# Patient Record
Sex: MALE | Race: WHITE | Employment: OTHER | URBAN - METROPOLITAN AREA
[De-identification: names, ages, dates, MRNs, and addresses within clinical notes are randomized per-mention and may not be internally consistent; named-entity substitution may affect disease eponyms.]

---

## 2019-11-13 ENCOUNTER — HOSPITAL ENCOUNTER (EMERGENCY)
Facility: CLINIC | Age: 74
Discharge: HOME | End: 2019-11-14
Attending: EMERGENCY MEDICINE | Admitting: EMERGENCY MEDICINE
Payer: MEDICARE

## 2019-11-13 ENCOUNTER — APPOINTMENT (OUTPATIENT)
Dept: GENERAL RADIOLOGY | Facility: CLINIC | Age: 74
End: 2019-11-13
Attending: EMERGENCY MEDICINE
Payer: MEDICARE

## 2019-11-13 VITALS
SYSTOLIC BLOOD PRESSURE: 136 MMHG | HEART RATE: 74 BPM | OXYGEN SATURATION: 99 % | DIASTOLIC BLOOD PRESSURE: 93 MMHG | RESPIRATION RATE: 18 BRPM | TEMPERATURE: 98.1 F

## 2019-11-13 DIAGNOSIS — S80.02XA CONTUSION OF LEFT KNEE, INITIAL ENCOUNTER: ICD-10-CM

## 2019-11-13 DIAGNOSIS — S80.01XA CONTUSION OF RIGHT KNEE, INITIAL ENCOUNTER: ICD-10-CM

## 2019-11-13 PROCEDURE — 73562 X-RAY EXAM OF KNEE 3: CPT | Mod: 50

## 2019-11-13 PROCEDURE — 99283 EMERGENCY DEPT VISIT LOW MDM: CPT

## 2019-11-13 ASSESSMENT — ENCOUNTER SYMPTOMS
ABDOMINAL PAIN: 0
ARTHRALGIAS: 1
JOINT SWELLING: 1
MYALGIAS: 1
NECK PAIN: 0

## 2019-11-13 NOTE — ED AVS SNAPSHOT
[facility] Emergency Department  [address]  [facility] [address]  Phone:  [phone]  Fax:  [phone]                                    Ajay Bowden   MRN: [id number]    Department:  [facility] Emergency Department   Date of Visit:  11/13/2019           After Visit Summary Signature Page    I have received my discharge instructions, and my questions have been answered. I have discussed any challenges I see with this plan with the nurse or doctor.    ..........................................................................................................................................  Patient/Patient Representative Signature      ..........................................................................................................................................  Patient Representative Print Name and Relationship to Patient    ..................................................               ................................................  Date                                   Time    ..........................................................................................................................................  Reviewed by Signature/Title    ...................................................              ..............................................  Date                                               Time          22EPIC Rev 08/18

## 2019-11-14 RX ORDER — SENNOSIDES 8.6 MG
1 TABLET ORAL 2 TIMES DAILY PRN
Qty: 30 TABLET | Refills: 0 | Status: SHIPPED | OUTPATIENT
Start: 2019-11-14

## 2019-11-14 RX ORDER — HYDROCODONE BITARTRATE AND ACETAMINOPHEN 5; 325 MG/1; MG/1
1 TABLET ORAL EVERY 4 HOURS PRN
Qty: 12 TABLET | Refills: 0 | Status: SHIPPED | OUTPATIENT
Start: 2019-11-14 | End: 2019-11-17

## 2019-11-14 NOTE — ED TRIAGE NOTES
Slip on ice and fell down onto both knees with both arms extended out to brace fall. Now c/o bilateral knee pain. Able to ambulate but with pain about 1 block back home. CMST intact. Denies any head injury, no LOC, no cervical tenderness. ABCs intact.

## 2019-11-14 NOTE — DISCHARGE INSTRUCTIONS
Please make an appointment to follow up with your primary care provider in 7 days if not improving.

## 2019-11-14 NOTE — ED PROVIDER NOTES
History     Chief Complaint:  Fall    HPI   Ajay Bowden is a 74 year old male, with a history of hypertension and hyperlipidemia, who presents with his wife to the ED for evaluation of mechanical fall. The patient reports he was on a sloped sidewalk and slipped on ice today. He fell forward onto his bilateral knees at 5:00PM. The left knee is worse. He was able to ambulate a block back home with pain. He had difficulty ambulating into the vehicle this evening and certain movement worsens the pain. He has taken Ibuprofen and been icing. The pain is non-radiating and aggravated by ambulation. The patient denies any loss of consciousness, head trauma, neck pain, chest pain, abdominal pain, hip pain, or other injuries. The patient denies history of knee surgeries or blood thinners.     Allergies:  Hydrochlorothiazide: urinary frequency & urgency      Medications:    Multivitamin  Vitamin D  Revatio   Nifedipine   Atorvastatin   Labetalol   Losartan   Flomax     Past Medical History:    Erectile dysfunction   GOMEZ, on CPAP  BPH  Colon polyps   Kidney stone   HLD  HTN    Past Surgical History:    Bilateral rotator cuff repair  Right inguinal herniorrhaphy   Lithotripsy   Umbilical herniorrhaphy   T&A  Sherif     Family History:    Prostate cancer: father  HTN, Alzheimer's: mother     Social History:  Smoking status: Never smoker    Alcohol use: Yes  Presents to ED with wife       Review of Systems   Cardiovascular: Negative for chest pain.   Gastrointestinal: Negative for abdominal pain.   Musculoskeletal: Positive for arthralgias, joint swelling and myalgias. Negative for neck pain.   Neurological: Negative for syncope.   All other systems reviewed and are negative.    Physical Exam     Patient Vitals for the past 24 hrs:   BP Temp Temp src Pulse Heart Rate Resp SpO2   11/13/19 2301 (!) 136/93 98.1  F (36.7  C) Temporal 74 74 18 99 %     Physical Exam    General:   Pleasant, age appropriate.  EYES:   Conjunctiva  normal.  NECK:    Supple, no meningismus.   CV:     Regular rate and rhythm     No murmurs, rubs or gallops.       2+ DP pulses bilateral.  PULM:    Clear to auscultation bilateral.       No respiratory distress.      No wheezing, rales or stridor.  ABD:    Soft, non-tender, non-distended.  No pulsatile masses.       No rebound or guarding.  MSK:     Right lower extremity :       No gross deformity.       No tenderness at the hip or ankle.      Knee:       Moderate soft tissue swelling       Full passive ROM.       Mild diffuse tenderness       Extensor mechanism intact.         Anterior and posterior drawer test normal.       Lachman's test normal.       No laxity of the MCL or LCL with valgus or varus strain.     Leftt lower extremity :       No gross deformity.       No tenderness at the hip or ankle.      Knee:       Moderate soft tissue swelling       Full passive ROM.       Moderate focal tenderness at medial joint space       Extensor mechanism intact.         Anterior and posterior drawer test normal.       Lachman's test normal.       No laxity of the MCL or LCL with valgus or varus strain.  LYMPH:   No cervical lymphadenopathy.  NEURO:   Lower extremities :Strength is 5/5      Sensation intact.  SKIN:    Warm, dry and intact.    PSYCH:    Mood is good and affect is appropriate.    Emergency Department Course     Imaging:  Radiographic findings were communicated with the patient and family who voiced understanding of the findings.    XR Knee Bilateral 3 Views  IMPRESSION:   RIGHT KNEE: Mild degenerative change. Anterior soft tissue edema and small joint effusion. Vascular calcification. No visible fracture or dislocation.  LEFT KNEE: Mild degenerative change. Anterior soft tissue edema and small joint effusion. No visible fracture or dislocation. Vascular calcification.  As read by Radiology.     Emergency Department Course:  Past medical records, nursing notes, and vitals reviewed.    2305: I performed an  exam of the patient as documented above.     The patient was sent for a bilateral knee x-ray while in the emergency department, results above.     2354: I rechecked the patient and discussed the results of his workup thus far.     0008: Per nurse's report, the patient was able to ambulate.     Findings and plan explained to the patient and spouse. Patient discharged home with instructions regarding supportive care, medications, and reasons to return. The importance of close follow-up was reviewed. The patient was prescribed Norco and Senokot.     I personally reviewed the results with the patient and spouse and answered all related questions prior to discharge.     Impression & Plan      Medical Decision Makin-year-old male presented to the ED with mechanical fall and subsequent traumatic bilateral knee pain.  Extensor mechanism and ligaments intact.  Plain films are unremarkable with no signs of fracture or dislocation.  Patient was able to ambulate in the ED.  No features concerning for occult fracture.  Findings are consistent with soft tissue contusion.  Patient safe for discharge home with supportive measures.    Diagnosis:    ICD-10-CM   1. Contusion of right knee, initial encounter S80.01XA   2. Contusion of left knee, initial encounter S80.02XA     Disposition: Patient discharged to home with wife      Discharge Medications:  New Prescriptions    HYDROCODONE-ACETAMINOPHEN (NORCO) 5-325 MG TABLET    Take 1 tablet by mouth every 4 hours as needed for severe pain    SENNOSIDES (SENOKOT) 8.6 MG TABLET    Take 1 tablet by mouth 2 times daily as needed for constipation     July Rowan  2019   [facility] EMERGENCY DEPARTMENT    Scribe Disclosure:  I, July Rowan, am serving as a scribe at 11:05 PM on 2019 to document services personally performed by Nestor Donald MD based on my observations and the provider's statements to me.        Nestor Donald  MD  11/14/19 0157

## 2022-04-08 ENCOUNTER — HOSPITAL ENCOUNTER (OUTPATIENT)
Facility: CLINIC | Age: 77
End: 2022-04-08
Attending: UROLOGY | Admitting: UROLOGY
Payer: MEDICARE

## 2025-05-21 ENCOUNTER — APPOINTMENT (OUTPATIENT)
Dept: MRI IMAGING | Facility: CLINIC | Age: 80
DRG: 872 | End: 2025-05-21
Attending: STUDENT IN AN ORGANIZED HEALTH CARE EDUCATION/TRAINING PROGRAM
Payer: COMMERCIAL

## 2025-05-21 ENCOUNTER — APPOINTMENT (OUTPATIENT)
Dept: GENERAL RADIOLOGY | Facility: CLINIC | Age: 80
DRG: 872 | End: 2025-05-21
Attending: STUDENT IN AN ORGANIZED HEALTH CARE EDUCATION/TRAINING PROGRAM
Payer: COMMERCIAL

## 2025-05-21 ENCOUNTER — HOSPITAL ENCOUNTER (INPATIENT)
Facility: CLINIC | Age: 80
DRG: 872 | End: 2025-05-21
Attending: STUDENT IN AN ORGANIZED HEALTH CARE EDUCATION/TRAINING PROGRAM | Admitting: HOSPITALIST
Payer: COMMERCIAL

## 2025-05-21 DIAGNOSIS — R50.9 FEVER IN ADULT: ICD-10-CM

## 2025-05-21 DIAGNOSIS — S22.31XA CLOSED FRACTURE OF ONE RIB OF RIGHT SIDE, INITIAL ENCOUNTER: ICD-10-CM

## 2025-05-21 DIAGNOSIS — R53.1 GENERALIZED WEAKNESS: ICD-10-CM

## 2025-05-21 DIAGNOSIS — N30.00 ACUTE CYSTITIS WITHOUT HEMATURIA: Primary | ICD-10-CM

## 2025-05-21 PROBLEM — G47.33 OSA (OBSTRUCTIVE SLEEP APNEA): Status: ACTIVE | Noted: 2017-10-05

## 2025-05-21 PROBLEM — N40.0 BPH WITHOUT URINARY OBSTRUCTION: Status: ACTIVE | Noted: 2017-08-09

## 2025-05-21 PROBLEM — F32.A ANXIETY AND DEPRESSION: Status: ACTIVE | Noted: 2024-02-08

## 2025-05-21 PROBLEM — F41.9 ANXIETY AND DEPRESSION: Status: ACTIVE | Noted: 2024-02-08

## 2025-05-21 PROBLEM — H81.11 BENIGN PAROXYSMAL POSITIONAL VERTIGO OF RIGHT EAR: Status: ACTIVE | Noted: 2022-09-16

## 2025-05-21 PROBLEM — K40.90 LEFT INGUINAL HERNIA: Status: ACTIVE | Noted: 2024-10-01

## 2025-05-21 PROBLEM — K40.91 UNILATERAL RECURRENT INGUINAL HERNIA WITHOUT OBSTRUCTION OR GANGRENE: Status: ACTIVE | Noted: 2025-04-08

## 2025-05-21 LAB
ALBUMIN UR-MCNC: 200 MG/DL
ANION GAP SERPL CALCULATED.3IONS-SCNC: 15 MMOL/L (ref 7–15)
APPEARANCE UR: ABNORMAL
BILIRUB UR QL STRIP: NEGATIVE
BUN SERPL-MCNC: 17.6 MG/DL (ref 8–23)
CALCIUM SERPL-MCNC: 9.8 MG/DL (ref 8.8–10.4)
CHLORIDE SERPL-SCNC: 102 MMOL/L (ref 98–107)
CK SERPL-CCNC: 106 U/L (ref 39–308)
COLOR UR AUTO: YELLOW
CREAT SERPL-MCNC: 1.09 MG/DL (ref 0.67–1.17)
EGFRCR SERPLBLD CKD-EPI 2021: 70 ML/MIN/1.73M2
ERYTHROCYTE [DISTWIDTH] IN BLOOD BY AUTOMATED COUNT: 14.2 % (ref 10–15)
FLUAV RNA SPEC QL NAA+PROBE: NEGATIVE
FLUBV RNA RESP QL NAA+PROBE: NEGATIVE
GLUCOSE SERPL-MCNC: 126 MG/DL (ref 70–99)
GLUCOSE UR STRIP-MCNC: NEGATIVE MG/DL
HCO3 SERPL-SCNC: 20 MMOL/L (ref 22–29)
HCT VFR BLD AUTO: 43.4 % (ref 40–53)
HGB BLD-MCNC: 15.1 G/DL (ref 13.3–17.7)
HGB UR QL STRIP: ABNORMAL
HOLD SPECIMEN: NORMAL
KETONES UR STRIP-MCNC: 20 MG/DL
LACTATE SERPL-SCNC: 0.8 MMOL/L (ref 0.7–2)
LEUKOCYTE ESTERASE UR QL STRIP: ABNORMAL
MCH RBC QN AUTO: 30 PG (ref 26.5–33)
MCHC RBC AUTO-ENTMCNC: 34.8 G/DL (ref 31.5–36.5)
MCV RBC AUTO: 86 FL (ref 78–100)
MUCOUS THREADS #/AREA URNS LPF: PRESENT /LPF
NITRATE UR QL: NEGATIVE
PH UR STRIP: 6 [PH] (ref 5–7)
PLATELET # BLD AUTO: 261 10E3/UL (ref 150–450)
POTASSIUM SERPL-SCNC: 3.8 MMOL/L (ref 3.4–5.3)
RBC # BLD AUTO: 5.04 10E6/UL (ref 4.4–5.9)
RBC URINE: 45 /HPF
RSV RNA SPEC NAA+PROBE: NEGATIVE
SARS-COV-2 RNA RESP QL NAA+PROBE: NEGATIVE
SODIUM SERPL-SCNC: 137 MMOL/L (ref 135–145)
SP GR UR STRIP: 1.02 (ref 1–1.03)
UROBILINOGEN UR STRIP-MCNC: NORMAL MG/DL
WBC # BLD AUTO: 24.2 10E3/UL (ref 4–11)
WBC URINE: >182 /HPF

## 2025-05-21 PROCEDURE — 250N000011 HC RX IP 250 OP 636: Performed by: STUDENT IN AN ORGANIZED HEALTH CARE EDUCATION/TRAINING PROGRAM

## 2025-05-21 PROCEDURE — 72148 MRI LUMBAR SPINE W/O DYE: CPT

## 2025-05-21 PROCEDURE — 258N000003 HC RX IP 258 OP 636: Performed by: PHYSICIAN ASSISTANT

## 2025-05-21 PROCEDURE — 96366 THER/PROPH/DIAG IV INF ADDON: CPT

## 2025-05-21 PROCEDURE — G0378 HOSPITAL OBSERVATION PER HR: HCPCS

## 2025-05-21 PROCEDURE — 87186 SC STD MICRODIL/AGAR DIL: CPT | Performed by: STUDENT IN AN ORGANIZED HEALTH CARE EDUCATION/TRAINING PROGRAM

## 2025-05-21 PROCEDURE — 96374 THER/PROPH/DIAG INJ IV PUSH: CPT

## 2025-05-21 PROCEDURE — 87637 SARSCOV2&INF A&B&RSV AMP PRB: CPT | Performed by: STUDENT IN AN ORGANIZED HEALTH CARE EDUCATION/TRAINING PROGRAM

## 2025-05-21 PROCEDURE — 36415 COLL VENOUS BLD VENIPUNCTURE: CPT | Performed by: STUDENT IN AN ORGANIZED HEALTH CARE EDUCATION/TRAINING PROGRAM

## 2025-05-21 PROCEDURE — 83605 ASSAY OF LACTIC ACID: CPT | Performed by: STUDENT IN AN ORGANIZED HEALTH CARE EDUCATION/TRAINING PROGRAM

## 2025-05-21 PROCEDURE — 80048 BASIC METABOLIC PNL TOTAL CA: CPT | Performed by: STUDENT IN AN ORGANIZED HEALTH CARE EDUCATION/TRAINING PROGRAM

## 2025-05-21 PROCEDURE — 93005 ELECTROCARDIOGRAM TRACING: CPT

## 2025-05-21 PROCEDURE — 81003 URINALYSIS AUTO W/O SCOPE: CPT | Performed by: STUDENT IN AN ORGANIZED HEALTH CARE EDUCATION/TRAINING PROGRAM

## 2025-05-21 PROCEDURE — 71101 X-RAY EXAM UNILAT RIBS/CHEST: CPT | Mod: RT

## 2025-05-21 PROCEDURE — 85027 COMPLETE CBC AUTOMATED: CPT | Performed by: STUDENT IN AN ORGANIZED HEALTH CARE EDUCATION/TRAINING PROGRAM

## 2025-05-21 PROCEDURE — 85007 BL SMEAR W/DIFF WBC COUNT: CPT | Performed by: STUDENT IN AN ORGANIZED HEALTH CARE EDUCATION/TRAINING PROGRAM

## 2025-05-21 PROCEDURE — 96365 THER/PROPH/DIAG IV INF INIT: CPT

## 2025-05-21 PROCEDURE — 82550 ASSAY OF CK (CPK): CPT | Performed by: STUDENT IN AN ORGANIZED HEALTH CARE EDUCATION/TRAINING PROGRAM

## 2025-05-21 PROCEDURE — 99285 EMERGENCY DEPT VISIT HI MDM: CPT | Mod: 25

## 2025-05-21 PROCEDURE — 99222 1ST HOSP IP/OBS MODERATE 55: CPT | Performed by: PHYSICIAN ASSISTANT

## 2025-05-21 PROCEDURE — 96361 HYDRATE IV INFUSION ADD-ON: CPT

## 2025-05-21 PROCEDURE — 87040 BLOOD CULTURE FOR BACTERIA: CPT | Performed by: PHYSICIAN ASSISTANT

## 2025-05-21 RX ORDER — AMOXICILLIN 250 MG
2 CAPSULE ORAL 2 TIMES DAILY PRN
Status: DISCONTINUED | OUTPATIENT
Start: 2025-05-21 | End: 2025-05-24 | Stop reason: HOSPADM

## 2025-05-21 RX ORDER — POLYETHYLENE GLYCOL 3350 17 G/17G
17 POWDER, FOR SOLUTION ORAL 2 TIMES DAILY PRN
Status: DISCONTINUED | OUTPATIENT
Start: 2025-05-21 | End: 2025-05-24 | Stop reason: HOSPADM

## 2025-05-21 RX ORDER — AMOXICILLIN 250 MG
1 CAPSULE ORAL 2 TIMES DAILY PRN
Status: DISCONTINUED | OUTPATIENT
Start: 2025-05-21 | End: 2025-05-24 | Stop reason: HOSPADM

## 2025-05-21 RX ORDER — ACETAMINOPHEN 500 MG
500 TABLET ORAL EVERY 6 HOURS PRN
COMMUNITY

## 2025-05-21 RX ORDER — ONDANSETRON 4 MG/1
4 TABLET, ORALLY DISINTEGRATING ORAL EVERY 6 HOURS PRN
Status: DISCONTINUED | OUTPATIENT
Start: 2025-05-21 | End: 2025-05-24 | Stop reason: HOSPADM

## 2025-05-21 RX ORDER — ONDANSETRON 2 MG/ML
4 INJECTION INTRAMUSCULAR; INTRAVENOUS EVERY 6 HOURS PRN
Status: DISCONTINUED | OUTPATIENT
Start: 2025-05-21 | End: 2025-05-24 | Stop reason: HOSPADM

## 2025-05-21 RX ORDER — NIFEDIPINE 90 MG/1
90 TABLET, EXTENDED RELEASE ORAL DAILY
COMMUNITY

## 2025-05-21 RX ORDER — VENLAFAXINE HYDROCHLORIDE 75 MG/1
75 CAPSULE, EXTENDED RELEASE ORAL DAILY
COMMUNITY

## 2025-05-21 RX ORDER — LABETALOL 200 MG/1
200 TABLET, FILM COATED ORAL 2 TIMES DAILY
COMMUNITY

## 2025-05-21 RX ORDER — OLMESARTAN MEDOXOMIL 40 MG/1
40 TABLET ORAL EVERY EVENING
COMMUNITY

## 2025-05-21 RX ORDER — ACETAMINOPHEN 325 MG/1
650 TABLET ORAL EVERY 4 HOURS PRN
Status: DISCONTINUED | OUTPATIENT
Start: 2025-05-21 | End: 2025-05-24 | Stop reason: HOSPADM

## 2025-05-21 RX ORDER — SPIRONOLACTONE 25 MG
12.5 TABLET ORAL DAILY
Status: DISCONTINUED | OUTPATIENT
Start: 2025-05-22 | End: 2025-05-24 | Stop reason: HOSPADM

## 2025-05-21 RX ORDER — LOSARTAN POTASSIUM 25 MG/1
100 TABLET ORAL EVERY EVENING
Status: DISCONTINUED | OUTPATIENT
Start: 2025-05-21 | End: 2025-05-24 | Stop reason: HOSPADM

## 2025-05-21 RX ORDER — POLYETHYLENE GLYCOL 3350 17 G/17G
1 POWDER, FOR SOLUTION ORAL DAILY PRN
COMMUNITY

## 2025-05-21 RX ORDER — VENLAFAXINE HYDROCHLORIDE 75 MG/1
75 CAPSULE, EXTENDED RELEASE ORAL DAILY
Status: DISCONTINUED | OUTPATIENT
Start: 2025-05-22 | End: 2025-05-24 | Stop reason: HOSPADM

## 2025-05-21 RX ORDER — ATORVASTATIN CALCIUM 20 MG/1
20 TABLET, FILM COATED ORAL EVERY EVENING
COMMUNITY

## 2025-05-21 RX ORDER — CEFTRIAXONE 1 G/1
1 INJECTION, POWDER, FOR SOLUTION INTRAMUSCULAR; INTRAVENOUS EVERY 24 HOURS
Status: DISCONTINUED | OUTPATIENT
Start: 2025-05-22 | End: 2025-05-21

## 2025-05-21 RX ORDER — NIFEDIPINE 90 MG/1
90 TABLET, EXTENDED RELEASE ORAL DAILY
Status: DISCONTINUED | OUTPATIENT
Start: 2025-05-22 | End: 2025-05-24 | Stop reason: HOSPADM

## 2025-05-21 RX ORDER — PROCHLORPERAZINE MALEATE 5 MG/1
5 TABLET ORAL EVERY 6 HOURS PRN
Status: DISCONTINUED | OUTPATIENT
Start: 2025-05-21 | End: 2025-05-24 | Stop reason: HOSPADM

## 2025-05-21 RX ORDER — ATORVASTATIN CALCIUM 20 MG/1
20 TABLET, FILM COATED ORAL EVERY EVENING
Status: DISCONTINUED | OUTPATIENT
Start: 2025-05-21 | End: 2025-05-24 | Stop reason: HOSPADM

## 2025-05-21 RX ORDER — LIDOCAINE 40 MG/G
CREAM TOPICAL
Status: DISCONTINUED | OUTPATIENT
Start: 2025-05-21 | End: 2025-05-24 | Stop reason: HOSPADM

## 2025-05-21 RX ORDER — CEFTRIAXONE 2 G/1
2 INJECTION, POWDER, FOR SOLUTION INTRAMUSCULAR; INTRAVENOUS EVERY 24 HOURS
Status: DISCONTINUED | OUTPATIENT
Start: 2025-05-22 | End: 2025-05-24 | Stop reason: HOSPADM

## 2025-05-21 RX ORDER — SPIRONOLACTONE 25 MG
12.5 TABLET ORAL DAILY
COMMUNITY

## 2025-05-21 RX ORDER — SODIUM CHLORIDE 9 MG/ML
INJECTION, SOLUTION INTRAVENOUS CONTINUOUS
Status: ACTIVE | OUTPATIENT
Start: 2025-05-21 | End: 2025-05-22

## 2025-05-21 RX ORDER — LABETALOL 100 MG/1
200 TABLET, FILM COATED ORAL 2 TIMES DAILY
Status: DISCONTINUED | OUTPATIENT
Start: 2025-05-21 | End: 2025-05-24 | Stop reason: HOSPADM

## 2025-05-21 RX ORDER — ACETAMINOPHEN 650 MG/1
650 SUPPOSITORY RECTAL EVERY 4 HOURS PRN
Status: DISCONTINUED | OUTPATIENT
Start: 2025-05-21 | End: 2025-05-24 | Stop reason: HOSPADM

## 2025-05-21 RX ORDER — CEFTRIAXONE 2 G/1
2 INJECTION, POWDER, FOR SOLUTION INTRAMUSCULAR; INTRAVENOUS ONCE
Status: COMPLETED | OUTPATIENT
Start: 2025-05-21 | End: 2025-05-21

## 2025-05-21 RX ADMIN — SODIUM CHLORIDE 1000 ML: 0.9 INJECTION, SOLUTION INTRAVENOUS at 20:49

## 2025-05-21 RX ADMIN — CEFTRIAXONE 2 G: 2 INJECTION, POWDER, FOR SOLUTION INTRAMUSCULAR; INTRAVENOUS at 17:35

## 2025-05-21 RX ADMIN — SODIUM CHLORIDE: 0.9 INJECTION, SOLUTION INTRAVENOUS at 18:41

## 2025-05-21 RX ADMIN — SODIUM CHLORIDE: 0.9 INJECTION, SOLUTION INTRAVENOUS at 23:06

## 2025-05-21 ASSESSMENT — ACTIVITIES OF DAILY LIVING (ADL)
ADLS_ACUITY_SCORE: 41

## 2025-05-21 ASSESSMENT — COLUMBIA-SUICIDE SEVERITY RATING SCALE - C-SSRS
6. HAVE YOU EVER DONE ANYTHING, STARTED TO DO ANYTHING, OR PREPARED TO DO ANYTHING TO END YOUR LIFE?: NO
2. HAVE YOU ACTUALLY HAD ANY THOUGHTS OF KILLING YOURSELF IN THE PAST MONTH?: NO
1. IN THE PAST MONTH, HAVE YOU WISHED YOU WERE DEAD OR WISHED YOU COULD GO TO SLEEP AND NOT WAKE UP?: NO

## 2025-05-21 NOTE — ED PROVIDER NOTES
Emergency Department Note      History of Present Illness     Chief Complaint   Fever and Leg Pain      HPI   Ajay Bowden is a very pleasant 79 year old male presenting with bilateral leg weakness. The patient reports that he was eating at a restaurant at noon yesterday when he suddenly developed pain in the legs bilaterally, mostly in the right quads and inner thigh. He was unable to walk. Now he mostly just has leg weakness without pain along with some generalized mid back pain, which began 2 days ago. He developed urinary incontinence last night, feeling like he had no control. His wife reports that his bedding was soaked from the urine. He has never had this before. Also had a fever of 102 at home 1.5 hours ago. Wife gave 2x Tylenol and now the fever has come down. She feels that he is sometimes out of it and unable to make decisions but then also is sometimes sharp. Low appetite. He fell about 3 weeks ago and landed on his right side. His right chest/side still hurts. He also had a hernia surgery 6 weeks ago but was not this weak at the time. He was able to walk before the start of symptoms at the restaurant. Also had a TURP procedure around a year ago. No history of prostate cancer. He is currently able to stand and walk on his own, feeling unsteady. The mid back pain comes on at night and is not severe but is definitely noticeable. He has been wearing Depends that he had from his TURP procedure. Denies abdominal pain, sore throat, cough, cancer history, or previous back surgery. No blood thinner use.    Independent Historian   Wife as detailed above.    Review of External Notes   I personally reviewed notes from the patient's nurse triage line call dated today. This provided me with information regarding patient's recent clinical course.     Past Medical History     Medical History and Problem List   Anxiety & depression  Benign prostatic hyperplasia  Hernia inguinal L  Hyperlipidemia  Hypertension  "essential  Obstructive sleep apnea  Sensorineural hearing loss B/L  Vertigo R    Medications   atorvastatin  labetalol  losartan  nifedipine  olmesartan  spironolactone  venlafaxine    Surgical History   Herniorrhaphy inguinal L  Herniorrhaphy umbilical  Lithotripsy  Rotator cuff repair R  Tonsil & adenoidectomy  TURP  Vasectomy    Physical Exam     Patient Vitals for the past 24 hrs:   BP Temp Temp src Pulse Resp SpO2 Height Weight   05/21/25 2020 -- -- -- -- -- 96 % -- --   05/21/25 1842 -- 98  F (36.7  C) Oral -- -- -- -- --   05/21/25 1356 (!) 134/90 99.1  F (37.3  C) Oral 111 17 96 % 1.854 m (6' 1\") 108 kg (238 lb)     Physical Exam  Vitals and nursing note reviewed.   Constitutional:       Appearance: Normal appearance. He is not ill-appearing or diaphoretic.   HENT:      Head: Atraumatic.      Mouth/Throat:      Mouth: Mucous membranes are moist.   Eyes:      Conjunctiva/sclera: Conjunctivae normal.   Cardiovascular:      Rate and Rhythm: Regular rhythm. Tachycardia present.      Heart sounds: Normal heart sounds.   Pulmonary:      Effort: Pulmonary effort is normal.      Breath sounds: Normal breath sounds.   Abdominal:      Palpations: Abdomen is soft.      Tenderness: There is no abdominal tenderness. There is no right CVA tenderness, left CVA tenderness, guarding or rebound.   Musculoskeletal:         General: No swelling, tenderness, deformity or signs of injury.   Skin:     General: Skin is warm and dry.      Findings: No erythema or rash.   Neurological:      Mental Status: He is alert and oriented to person, place, and time.      Sensory: No sensory deficit.      Motor: No weakness (5/5 strength in bilateral lower extremities).      Gait: Gait abnormal (pt slightly unsteady).      Deep Tendon Reflexes: Reflexes normal (2+ patella reflexes bilaterally).           Diagnostics     Lab Results   Labs Ordered and Resulted from Time of ED Arrival to Time of ED Departure   BASIC METABOLIC PANEL - Abnormal    "    Result Value    Sodium 137      Potassium 3.8      Chloride 102      Carbon Dioxide (CO2) 20 (*)     Anion Gap 15      Urea Nitrogen 17.6      Creatinine 1.09      GFR Estimate 70      Calcium 9.8      Glucose 126 (*)    ROUTINE UA WITH MICROSCOPIC REFLEX TO CULTURE - Abnormal    Color Urine Yellow      Appearance Urine Cloudy (*)     Glucose Urine Negative      Bilirubin Urine Negative      Ketones Urine 20 (*)     Specific Gravity Urine 1.019      Blood Urine Moderate (*)     pH Urine 6.0      Protein Albumin Urine 200 (*)     Urobilinogen Urine Normal      Nitrite Urine Negative      Leukocyte Esterase Urine Large (*)     Mucus Urine Present (*)     RBC Urine 45 (*)     WBC Urine >182 (*)    CBC WITH PLATELETS AND DIFFERENTIAL - Abnormal    WBC Count 24.2 (*)     RBC Count 5.04      Hemoglobin 15.1      Hematocrit 43.4      MCV 86      MCH 30.0      MCHC 34.8      RDW 14.2      Platelet Count 261     MANUAL DIFFERENTIAL - Abnormal    % Neutrophils 83      % Lymphocytes 5      % Monocytes 12      % Eosinophils 0      % Basophils 0      Absolute Neutrophils 20.1 (*)     Absolute Lymphocytes 1.2      Absolute Monocytes 2.9 (*)     Absolute Eosinophils 0.0      Absolute Basophils 0.0     LACTIC ACID WHOLE BLOOD WITH 1X REPEAT IN 2 HR WHEN >2 - Normal    Lactic Acid, Initial 0.8     CK TOTAL - Normal         INFLUENZA A/B, RSV AND SARS-COV2 PCR - Normal    Influenza A PCR Negative      Influenza B PCR Negative      RSV PCR Negative      SARS CoV2 PCR Negative     RBC AND PLATELET MORPHOLOGY   URINE CULTURE   BLOOD CULTURE   BLOOD CULTURE       Imaging   Ribs XR, unilat 3 views + PA chest, right   Final Result   IMPRESSION: The visualized heart and lungs are negative. No displaced rib fractures.      Lumbar spine MRI w/o contrast   Final Result   IMPRESSION:   1.  Multilevel degenerative changes of the lumbar spine detailed above. No high-grade spinal canal stenosis or cauda equina nerve root compression.   2.   Moderate right neural foraminal stenosis at L3-L4.           EKG  ECG taken at 1503, ECG read at 1510  Sinus tachycardia  Possible left atrial enlargement  Left axis deviation  Right bundle branch block  Abnormal ECG    Rate 108 bpm. HI interval 168 ms. QRS duration 170 ms. QT/QTc 374/501 ms. P-R-T axes 25 -40 -17.     Independent Interpretation   X-ray ribs shows displaced right rib fracture    ED Course      Medications Administered   Medications   senna-docusate (SENOKOT-S/PERICOLACE) 8.6-50 MG per tablet 1 tablet (has no administration in time range)     Or   senna-docusate (SENOKOT-S/PERICOLACE) 8.6-50 MG per tablet 2 tablet (has no administration in time range)   ondansetron (ZOFRAN ODT) ODT tab 4 mg (has no administration in time range)     Or   ondansetron (ZOFRAN) injection 4 mg (has no administration in time range)   prochlorperazine (COMPAZINE) injection 5 mg (has no administration in time range)     Or   prochlorperazine (COMPAZINE) tablet 5 mg (has no administration in time range)   lidocaine 1 % 0.1-1 mL (has no administration in time range)   lidocaine (LMX4) cream (has no administration in time range)   sodium chloride (PF) 0.9% PF flush 3 mL (has no administration in time range)   sodium chloride (PF) 0.9% PF flush 3 mL (3 mLs Intracatheter $Given 5/21/25 1822)   sodium chloride 0.9 % infusion ( Intravenous $New Bag 5/21/25 1841)   acetaminophen (TYLENOL) tablet 650 mg (has no administration in time range)     Or   acetaminophen (TYLENOL) Suppository 650 mg (has no administration in time range)   polyethylene glycol (MIRALAX) Packet 17 g (has no administration in time range)   cefTRIAXone (ROCEPHIN) 1 g vial to attach to  mL bag for ADULTS or NS 50 mL bag for PEDS (has no administration in time range)   cefTRIAXone (ROCEPHIN) 2 g vial to attach to  ml bag for ADULTS or NS 50 ml bag for PEDS (2 g Intravenous $New Bag 5/21/25 1733)       Procedures    None performed    Discussion of  Management   Hospitalist - Katrhyn Soto PA-C     ED Course   ED Course as of 05/21/25 2034   Wed May 21, 2025   1423 I evaluated the patient and obtained history.      1723 I rechecked and updated the patient.      1735 I spoke with Kathryn DURAN of the hospitalist service regarding admission.         Additional Documentation  None    Medical Decision Making / Diagnosis     CMS Diagnoses: The patient has signs of sepsis   Sepsis ED evaluation   The patient has signs of sepsis as evidenced by:  1. Presence of 2 SIRS criteria, suspected infection, AND  2. Organ dysfunction: Sepsis work up in progress. Will continue to monitor for signs of organ dysfunction    Sepsis Care Initiation: Starting at 1522 PM on 05/21/25, until specified. Prior to this documentation, sepsis, severe sepsis, or septic shock was NOT thought to be a significant cause of illness. This order represents the first time infection was seriously considered to be affecting the patient.    Lactic Acid Results:  Recent Labs   Lab Test 05/21/25  1454   LACT 0.8       3 Hour Bundle 6 Hour Bundle (Reassessment)   Blood Cultures before IV Antibiotics: Yes  Antibiotics given: see below  Prehospital fluid volume (mL):                     Total fluids given (ED +Pre-hospital):  The patient responded to a lesser volume of IV fluids. The initial volume ordered was 100 mL.    Repeat Lactic Acid Level: Ordered by reflex for 2 hours after initial lactic acid collection.  Vasopressors: MAP>65 after initial IVF bolus, will continue to monitor fluid status and vital signs.  Repeat perfusion exam: I attest to having performed a repeat sepsis exam and assessment of perfusion at 1800 PM.   BMI Readings from Last 1 Encounters:   05/21/25 31.40 kg/m        Anti-infectives (From admission through now)      Start     Dose/Rate Route Frequency Ordered Stop    05/21/25 1605  cefTRIAXone (ROCEPHIN) 2 g vial to attach to  ml bag for ADULTS or NS 50 ml bag for  PEDS         2 g  over 30 Minutes Intravenous ONCE 05/21/25 1604 05/21/25 1805                MIPS       CT/MRI of the lumbar spine was obtained because of neurologic signs or symptoms (urinary incontinence).    MDM   Patient presenting with urinary incontinence, subjective weakness in the lower extremities, fever.  Vital signs notable for tachycardia.  On my evaluation, I do not appreciate significant weakness in the lower extremities.  Considered differential including cauda equina syndrome, epidural abscess, transverse myelitis, myositis, urinary tract infection, among others.  Workup is notable for significant leukocytosis but no elevation in lactic acid.  Due to concerns for spinal cord pathology, I did obtain an MRI of the lumbar spine.  This appeared reassuring, showing degenerative changes but no acute pathology.  Workup was notable for UTI, which I think likely explains the patient's symptoms, especially with no significant objective weakness on exam.  Patient meets sepsis criteria with leukocytosis and tachycardia.  Did send blood cultures and started broad-spectrum antibiotics.  Patient was admitted to the hospitalist for further evaluation and management.      Due to patient's follow-up couple of weeks ago and persistent right chest wall pain, I did obtain x-ray, which showed a displaced rib fracture on my evaluation.  Patient is managing his pain fairly well.       Disposition   The patient was admitted to the hospital.     Diagnosis     ICD-10-CM    1. Acute cystitis without hematuria  N30.00       2. Generalized weakness  R53.1       3. Fever in adult  R50.9       4. Closed fracture of one rib of right side, initial encounter  S22.31XA                 Lorne Peres MD  05/21/25 2034

## 2025-05-21 NOTE — ED NOTES
"[facility]  ED Nurse Handoff Report    ED Chief complaint: Fever and Leg Pain  . ED Diagnosis:   Final diagnoses:   Acute cystitis without hematuria   Generalized weakness   Fever in adult       Allergies:   Allergies   Allergen Reactions    Hydrochlorothiazide Other (See Comments)     Stopped 7/2012 due to urinary freq and urgency       Code Status: Full Code    Activity level - Baseline/Home:  independent.  Activity Level - Current:   assist of 1.   Lift room needed: No.   Bariatric: No   Needed: No   Isolation: No.   Infection: Not Applicable.     Respiratory status: Room air    Vital Signs (within 30 minutes):   Vitals:    05/21/25 1356   BP: (!) 134/90   Pulse: 111   Resp: 17   Temp: 99.1  F (37.3  C)   TempSrc: Oral   SpO2: 96%   Weight: 108 kg (238 lb)   Height: 1.854 m (6' 1\")       Cardiac Rhythm:  ,      Pain level:    Patient confused: No.   Patient Falls Risk: activity supervised.   Elimination Status: Has voided     Patient Report - Initial Complaint: Fever, leg pain.   Focused Assessment: Ajay Bowden is a very pleasant 79 year old male presenting with bilateral leg weakness. The patient reports that he was eating at a restaurant at noon yesterday when he suddenly developed pain in the legs bilaterally, mostly in the right quads and inner thigh. He was unable to walk. Now he mostly just has leg weakness without pain along with some generalized mid back pain, which began 2 days ago. He developed urinary incontinence last night, feeling like he had no control. His wife reports that his bedding was soaked from the urine. He has never had this before. Also had a fever of 102 at home 1.5 hours ago. Wife gave 2x Tylenol and now the fever has come down. She feels that he is sometimes out of it and unable to make decisions but then also is sometimes sharp. Low appetite. He fell about 3 weeks ago and landed on his right side. His right chest/side still hurts. He also had a " hernia surgery 6 weeks ago but was not this weak at the time. He was able to walk before the start of symptoms at the restaurant. Also had a TURP procedure around a year ago. He is currently able to stand and walk on his own, feeling unsteady. The mid back pain comes on at night and is not severe but is definitely noticeable. He has been wearing Depends that he had from his TURP procedure. Denies abdominal pain, sore throat, cough, cancer history, or previous back surgery. No blood thinner use.         Abnormal Results:   Labs Ordered and Resulted from Time of ED Arrival to Time of ED Departure   BASIC METABOLIC PANEL - Abnormal       Result Value    Sodium 137      Potassium 3.8      Chloride 102      Carbon Dioxide (CO2) 20 (*)     Anion Gap 15      Urea Nitrogen 17.6      Creatinine 1.09      GFR Estimate 70      Calcium 9.8      Glucose 126 (*)    ROUTINE UA WITH MICROSCOPIC REFLEX TO CULTURE - Abnormal    Color Urine Yellow      Appearance Urine Cloudy (*)     Glucose Urine Negative      Bilirubin Urine Negative      Ketones Urine 20 (*)     Specific Gravity Urine 1.019      Blood Urine Moderate (*)     pH Urine 6.0      Protein Albumin Urine 200 (*)     Urobilinogen Urine Normal      Nitrite Urine Negative      Leukocyte Esterase Urine Large (*)     Mucus Urine Present (*)     RBC Urine 45 (*)     WBC Urine >182 (*)    CBC WITH PLATELETS AND DIFFERENTIAL - Abnormal    WBC Count 24.2 (*)     RBC Count 5.04      Hemoglobin 15.1      Hematocrit 43.4      MCV 86      MCH 30.0      MCHC 34.8      RDW 14.2      Platelet Count 261     LACTIC ACID WHOLE BLOOD WITH 1X REPEAT IN 2 HR WHEN >2 - Normal    Lactic Acid, Initial 0.8     CK TOTAL - Normal         INFLUENZA A/B, RSV AND SARS-COV2 PCR - Normal    Influenza A PCR Negative      Influenza B PCR Negative      RSV PCR Negative      SARS CoV2 PCR Negative     MANUAL DIFFERENTIAL   RBC AND PLATELET MORPHOLOGY   URINE CULTURE   BLOOD CULTURE   BLOOD CULTURE         Ribs XR, unilat 3 views + PA chest, right   Final Result   IMPRESSION: The visualized heart and lungs are negative. No displaced rib fractures.      Lumbar spine MRI w/o contrast   Final Result   IMPRESSION:   1.  Multilevel degenerative changes of the lumbar spine detailed above. No high-grade spinal canal stenosis or cauda equina nerve root compression.   2.  Moderate right neural foraminal stenosis at L3-L4.             Treatments provided: see MAR  Family Comments: N/A  OBS brochure/video discussed/provided to patient:  Yes  ED Medications:   Medications   cefTRIAXone (ROCEPHIN) 2 g vial to attach to  ml bag for ADULTS or NS 50 ml bag for PEDS (2 g Intravenous $New Bag 5/21/25 9228)       Drips infusing:  Yes  For the majority of the shift this patient was Green.   Interventions performed were N/A.    Sepsis treatment initiated: No    Cares/treatment/interventions/medications to be completed following ED care: see inpatient admit orders.     ED Nurse Name: Ayesha Vance RN  5:43 PM'    RECEIVING UNIT ED HANDOFF REVIEW    Above ED Nurse Handoff Report was reviewed: Yes  Reviewed by: Vanita Gooden RN on May 21, 2025 at 10:41 PM   RETA Ibrahim called the ED to inform them the note was read: Yes

## 2025-05-21 NOTE — ED TRIAGE NOTES
Pt arrives via EMS w/ complaints of increase severe incontinence and pain in bilateral legs that started yesterday. Pt also notes fever that started last night of 102. Pt states he usually does not need any assistance w/ walking and is having a difficult time currently getting up by self.      Of note pt had hernia repair at [facility] in April.

## 2025-05-21 NOTE — PHARMACY-ADMISSION MEDICATION HISTORY
Pharmacist Admission Medication History    Admission medication history is complete. The information provided in this note is only as accurate as the sources available at the time of the update.    Information Source(s): Patient via in-person    Pertinent Information: outside meds    Changes made to PTA medication list:  Added: all  Deleted: senna  Changed: None    Allergies reviewed with patient and updates made in EHR: yes    Medication History Completed By: Ajay Li RPH 5/21/2025 6:07 PM    PTA Med List   Medication Sig Last Dose/Taking    acetaminophen (TYLENOL) 500 MG tablet Take 500 mg by mouth every 6 hours as needed for mild pain. Taking As Needed    atorvastatin (LIPITOR) 20 MG tablet Take 20 mg by mouth every evening. 5/20/2025 Evening    labetalol (NORMODYNE) 200 MG tablet Take 200 mg by mouth 2 times daily. 5/20/2025    NIFEdipine ER OSMOTIC (PROCARDIA XL) 90 MG 24 hr tablet Take 90 mg by mouth daily. 5/21/2025 Morning    olmesartan (BENICAR) 40 MG tablet Take 40 mg by mouth every evening. 5/20/2025 Evening    polyethylene glycol (MIRALAX) 17 g packet Take 1 packet by mouth daily as needed for constipation. Taking As Needed    spironolactone (ALDACTONE) 12.5 mg TABS half-tab Take 12.5 mg by mouth daily. 5/20/2025 Morning    venlafaxine (EFFEXOR XR) 75 MG 24 hr capsule Take 75 mg by mouth daily. 5/20/2025 Morning

## 2025-05-21 NOTE — H&P
"[facility]    History and Physical - Hospitalist Service       Date of Admission:  5/21/2025    Assessment & Plan      Ajay Bowden is a 79 year old male with PMHx significant for HTN, HLP, BPH, GOMEZ, anxiety and recent hernia repair, who was admitted on 5/21/2025 with UTI.     UTI  Pt notes onset of bilateral upper leg pain mid day on Monday with associated weakness and low back pain. The pain has improved but now feels weak in both legs. He also developed significant urinary incontinence, which is unusual for him (he does dribble at baseline since TURP). He notes fever of 102 at home earlier today and took Tylenol.   T 99.1, , VS otherwise stable. BMP unremarkable. CBC significant for WBC of 24.2. Lactic acid is normal. UA abnormal, large LE with >182 WBCs, mod blood. Urine and blood cultures sent. EKG sinus tachycardia. MRI lumbar spine obtained due to pain, no significant findings. CXR/rib film done due to reported fall a couple weeks ago and ongoing chest pain, no rib fxs per report.  He was given IV Rocephin in the ED.  - admit to OBS  - continue IV Rocephin  - will give 1L NS over 10 hrs.  - follow cultures  - recheck labs in AM    HTN  Managed with spironolactone, labetalol, olmesartan and nifedipine  - resume home meds with parameters  HLP  - hold statin while OBS  BPH  S/p TURP in 2022  GOMEZ  Uses CPAP  Anxiety  - resume home Effexor  R inguinal hernia repair on 4/8/25        Diet:  regular diet  DVT Prophylaxis: Pneumatic Compression Devices  Vásquez Catheter: Not present  Lines: None     Cardiac Monitoring: None  Code Status:  full code    Clinically Significant Risk Factors Present on Admission                   # Hypertension: Noted on problem list           # Obesity: Estimated body mass index is 31.4 kg/m  as calculated from the following:    Height as of this encounter: 1.854 m (6' 1\").    Weight as of this encounter: 108 kg (238 lb).              Disposition Plan "     Medically Ready for Discharge: Anticipated Tomorrow         The patient's care was discussed with the Patient and ED provider, Dr. Peres.    Kathryn Soto PA-C  Hospitalist Service  [facility]  Securely message with [place] (more info)  Text page via [facility] Paging/Directory     ______________________________________________________________________    Chief Complaint   Bilateral leg weakness    History is obtained from the patient    History of Present Illness   Ajay Bowden is a 79 year old male with PMHx significant for HTN, HLP, BPH, GOMEZ, anxiety and recent hernia repair who presents to the ED with bilateral leg weakness.  Patient states he developed bilateral upper leg pain just after lunchtime on Monday.  He noted associated weakness as well.  He also noted low back pain.  He then developed significant urinary incontinence yesterday.  He was unable to control the flow of urine at all.  Today he notes a fever of 102 at home and took Tylenol prior to coming into the ED.  He continues to feel somewhat chilled.  He denies chest pain, shortness of breath, abdominal pain, vomiting, or diarrhea.  He does note some nausea last evening.      Past Medical History    HTN  HLP  BPH  GOMEZ  Anxiety     Past Surgical History   Inguinal hernia repair  TURP    Prior to Admission Medications   Prior to Admission Medications   Prescriptions Last Dose Informant Patient Reported? Taking?   sennosides (SENOKOT) 8.6 MG tablet   No No   Sig: Take 1 tablet by mouth 2 times daily as needed for constipation      Facility-Administered Medications: None           Physical Exam   Vital Signs: Temp: 99.1  F (37.3  C) Temp src: Oral BP: (!) 134/90 Pulse: 111   Resp: 17 SpO2: 96 % O2 Device: None (Room air)    Weight: 238 lbs 0 oz    GENERAL:  Comfortable.  PSYCH: pleasant, oriented, No acute distress.  HEENT:  Atraumatic, normocephalic. Normal conjunctiva, normal hearing, and oropharynx is normal.  NECK:   Supple, no neck vein distention  HEART:  Normal S1, S2 with no murmur, no pericardial rub, gallops or S3 or S4.  LUNGS:  Clear to auscultation, normal Respiratory effort. No wheezing, rales or ronchi.  GI:  Soft, normal bowel sounds. Non-tender, non distended.   EXTREMITIES:  No pedal edema, +2 pulses bilateral and equal.  SKIN:  Dry to touch, No rash, wound or ulcerations.  NEUROLOGIC:  CN 2-12 intact, BL 5/5 symmetric upper and lower extremity strength, sensation is intact with no focal deficits.      Medical Decision Making       65 MINUTES SPENT BY ME on the date of service doing chart review, history, exam, documentation & further activities per the note.      Data     I have personally reviewed the following data over the past 24 hrs:    24.2 (H)  \   15.1   / 261     137 102 17.6 /  126 (H)   3.8 20 (L) 1.09 \     Procal: N/A CRP: N/A Lactic Acid: 0.8         Imaging results reviewed over the past 24 hrs:   Recent Results (from the past 24 hours)   Lumbar spine MRI w/o contrast    Narrative    EXAM: MR LUMBAR SPINE W/O CONTRAST  LOCATION: [facility]  DATE: 5/21/2025    INDICATION: new incontinence, bilateral leg weakness, fever  COMPARISON: CT abdomen pelvis 04/02/2025.  TECHNIQUE: Routine Lumbar Spine MRI without IV contrast.    FINDINGS:   Nomenclature is based on 5 lumbar type vertebral bodies. Vertebral body heights are maintained. There is normal alignment of the lumbar spine. Diffusely heterogeneous bone marrow signal without evidence of focal aggressive lesion. Scattered multilevel   small intraosseous hemangiomas. Normal distal spinal cord and cauda equina with conus medullaris at L1. Simple appearing bilateral renal cysts, better characterized on the prior CT abdomen pelvis. Unremarkable visualized bony pelvis.    T12-L1: Normal disc height. No disc bulge or spinal canal stenosis. No facet arthropathy. No neural foraminal stenosis.     L1-L2: Normal disc height. Trace disc  bulge. No spinal canal stenosis. Mild bilateral facet arthropathy. No neural foraminal stenosis.    L2-L3: Normal disc height. Trace disc bulge. No spinal canal stenosis. Mild bilateral facet arthropathy. No neural foraminal stenosis.     L3-L4: Mild intervertebral disc height loss. Mild disc bulge and ligamentum flavum thickening results in mild spinal canal stenosis and mild bilateral lateral recess stenosis. There is moderate bilateral facet arthropathy. Moderate right neural foraminal   stenosis and mild left neural foraminal stenosis.    L4-L5: Moderate intervertebral disc height loss and associated Modic type II endplate degenerative signal changes. Mild disc bulge. No spinal canal stenosis. Moderate bilateral facet arthropathy. Mild-to-moderate left neural foraminal stenosis. Mild   right neural foraminal stenosis.    L5-S1: Mild intervertebral disc height loss. No disc bulge or spinal canal stenosis. Mild bilateral facet arthropathy. No neural foraminal stenosis.      Impression    IMPRESSION:  1.  Multilevel degenerative changes of the lumbar spine detailed above. No high-grade spinal canal stenosis or cauda equina nerve root compression.  2.  Moderate right neural foraminal stenosis at L3-L4.     Ribs XR, unilat 3 views + PA chest, right    Narrative    EXAM: XR RIBS and CHEST RT 3VW  LOCATION: [facility]  DATE: 5/21/2025    INDICATION: fall two weeks ago, rib pain  COMPARISON: None.      Impression    IMPRESSION: The visualized heart and lungs are negative. No displaced rib fractures.

## 2025-05-22 VITALS
HEART RATE: 102 BPM | SYSTOLIC BLOOD PRESSURE: 134 MMHG | BODY MASS INDEX: 31.54 KG/M2 | WEIGHT: 238 LBS | DIASTOLIC BLOOD PRESSURE: 80 MMHG | HEIGHT: 73 IN | TEMPERATURE: 98.1 F | OXYGEN SATURATION: 96 % | RESPIRATION RATE: 18 BRPM

## 2025-05-22 PROBLEM — S22.31XA CLOSED FRACTURE OF ONE RIB OF RIGHT SIDE, INITIAL ENCOUNTER: Status: ACTIVE | Noted: 2025-05-22

## 2025-05-22 LAB
ANION GAP SERPL CALCULATED.3IONS-SCNC: 13 MMOL/L (ref 7–15)
ATRIAL RATE - MUSE: 108 BPM
BACTERIA SPEC CULT: NORMAL
BACTERIA UR CULT: ABNORMAL
BACTERIA UR CULT: ABNORMAL
BASOPHILS # BLD MANUAL: 0 10E3/UL (ref 0–0.2)
BASOPHILS NFR BLD MANUAL: 0 %
BUN SERPL-MCNC: 19 MG/DL (ref 8–23)
CALCIUM SERPL-MCNC: 8.7 MG/DL (ref 8.8–10.4)
CHLORIDE SERPL-SCNC: 105 MMOL/L (ref 98–107)
CREAT SERPL-MCNC: 1.12 MG/DL (ref 0.67–1.17)
DIASTOLIC BLOOD PRESSURE - MUSE: NORMAL MMHG
EGFRCR SERPLBLD CKD-EPI 2021: 68 ML/MIN/1.73M2
EOSINOPHIL # BLD MANUAL: 0 10E3/UL (ref 0–0.7)
EOSINOPHIL NFR BLD MANUAL: 0 %
ERYTHROCYTE [DISTWIDTH] IN BLOOD BY AUTOMATED COUNT: 14.3 % (ref 10–15)
GLUCOSE SERPL-MCNC: 119 MG/DL (ref 70–99)
HCO3 SERPL-SCNC: 22 MMOL/L (ref 22–29)
HCT VFR BLD AUTO: 39 % (ref 40–53)
HGB BLD-MCNC: 13.2 G/DL (ref 13.3–17.7)
INTERPRETATION ECG - MUSE: NORMAL
LYMPHOCYTES # BLD MANUAL: 1.2 10E3/UL (ref 0.8–5.3)
LYMPHOCYTES NFR BLD MANUAL: 5 %
MCH RBC QN AUTO: 29.7 PG (ref 26.5–33)
MCHC RBC AUTO-ENTMCNC: 33.8 G/DL (ref 31.5–36.5)
MCV RBC AUTO: 88 FL (ref 78–100)
MONOCYTES # BLD MANUAL: 2.9 10E3/UL (ref 0–1.3)
MONOCYTES NFR BLD MANUAL: 12 %
NEUTROPHILS # BLD MANUAL: 20.1 10E3/UL (ref 1.6–8.3)
NEUTROPHILS NFR BLD MANUAL: 83 %
P AXIS - MUSE: 25 DEGREES
PATH REV: ABNORMAL
PLATELET # BLD AUTO: 206 10E3/UL (ref 150–450)
POTASSIUM SERPL-SCNC: 3.8 MMOL/L (ref 3.4–5.3)
PR INTERVAL - MUSE: 168 MS
QRS DURATION - MUSE: 170 MS
QT - MUSE: 374 MS
QTC - MUSE: 501 MS
R AXIS - MUSE: -40 DEGREES
RBC # BLD AUTO: 4.45 10E6/UL (ref 4.4–5.9)
SODIUM SERPL-SCNC: 140 MMOL/L (ref 135–145)
SYSTOLIC BLOOD PRESSURE - MUSE: NORMAL MMHG
T AXIS - MUSE: -17 DEGREES
VENTRICULAR RATE- MUSE: 108 BPM
WBC # BLD AUTO: 22.3 10E3/UL (ref 4–11)

## 2025-05-22 PROCEDURE — 250N000011 HC RX IP 250 OP 636: Performed by: PHYSICIAN ASSISTANT

## 2025-05-22 PROCEDURE — 87040 BLOOD CULTURE FOR BACTERIA: CPT | Performed by: PHYSICIAN ASSISTANT

## 2025-05-22 PROCEDURE — 85027 COMPLETE CBC AUTOMATED: CPT | Performed by: PHYSICIAN ASSISTANT

## 2025-05-22 PROCEDURE — 250N000013 HC RX MED GY IP 250 OP 250 PS 637: Performed by: PHYSICIAN ASSISTANT

## 2025-05-22 PROCEDURE — 36415 COLL VENOUS BLD VENIPUNCTURE: CPT | Performed by: PHYSICIAN ASSISTANT

## 2025-05-22 PROCEDURE — 258N000003 HC RX IP 258 OP 636: Performed by: PHYSICIAN ASSISTANT

## 2025-05-22 PROCEDURE — 82565 ASSAY OF CREATININE: CPT | Performed by: PHYSICIAN ASSISTANT

## 2025-05-22 PROCEDURE — 99232 SBSQ HOSP IP/OBS MODERATE 35: CPT | Performed by: PHYSICIAN ASSISTANT

## 2025-05-22 PROCEDURE — 96361 HYDRATE IV INFUSION ADD-ON: CPT

## 2025-05-22 PROCEDURE — 120N000004 HC R&B MS OVERFLOW

## 2025-05-22 PROCEDURE — 80048 BASIC METABOLIC PNL TOTAL CA: CPT | Performed by: PHYSICIAN ASSISTANT

## 2025-05-22 RX ORDER — SODIUM CHLORIDE, SODIUM LACTATE, POTASSIUM CHLORIDE, CALCIUM CHLORIDE 600; 310; 30; 20 MG/100ML; MG/100ML; MG/100ML; MG/100ML
INJECTION, SOLUTION INTRAVENOUS CONTINUOUS
Status: ACTIVE | OUTPATIENT
Start: 2025-05-22 | End: 2025-05-23

## 2025-05-22 RX ADMIN — VENLAFAXINE HYDROCHLORIDE 75 MG: 75 CAPSULE, EXTENDED RELEASE ORAL at 09:18

## 2025-05-22 RX ADMIN — CEFTRIAXONE 2 G: 2 INJECTION, POWDER, FOR SOLUTION INTRAMUSCULAR; INTRAVENOUS at 16:59

## 2025-05-22 RX ADMIN — ACETAMINOPHEN 650 MG: 325 TABLET, FILM COATED ORAL at 00:11

## 2025-05-22 RX ADMIN — SODIUM CHLORIDE, SODIUM LACTATE, POTASSIUM CHLORIDE, AND CALCIUM CHLORIDE: .6; .31; .03; .02 INJECTION, SOLUTION INTRAVENOUS at 15:15

## 2025-05-22 RX ADMIN — ACETAMINOPHEN 650 MG: 325 TABLET, FILM COATED ORAL at 11:56

## 2025-05-22 RX ADMIN — NIFEDIPINE 90 MG: 90 TABLET, EXTENDED RELEASE ORAL at 09:18

## 2025-05-22 RX ADMIN — SODIUM CHLORIDE, SODIUM LACTATE, POTASSIUM CHLORIDE, AND CALCIUM CHLORIDE: .6; .31; .03; .02 INJECTION, SOLUTION INTRAVENOUS at 16:38

## 2025-05-22 RX ADMIN — SPIRONOLACTONE 12.5 MG: 25 TABLET ORAL at 09:18

## 2025-05-22 RX ADMIN — LABETALOL HYDROCHLORIDE 200 MG: 100 TABLET, FILM COATED ORAL at 19:35

## 2025-05-22 ASSESSMENT — ACTIVITIES OF DAILY LIVING (ADL)
ADLS_ACUITY_SCORE: 55
ADLS_ACUITY_SCORE: 55
ADLS_ACUITY_SCORE: 44
ADLS_ACUITY_SCORE: 44
ADLS_ACUITY_SCORE: 55
ADLS_ACUITY_SCORE: 45
ADLS_ACUITY_SCORE: 44
ADLS_ACUITY_SCORE: 45
ADLS_ACUITY_SCORE: 44
ADLS_ACUITY_SCORE: 51
ADLS_ACUITY_SCORE: 44
ADLS_ACUITY_SCORE: 44
ADLS_ACUITY_SCORE: 51
ADLS_ACUITY_SCORE: 55
ADLS_ACUITY_SCORE: 44
ADLS_ACUITY_SCORE: 45
ADLS_ACUITY_SCORE: 45
ADLS_ACUITY_SCORE: 55
ADLS_ACUITY_SCORE: 44
ADLS_ACUITY_SCORE: 51
ADLS_ACUITY_SCORE: 47

## 2025-05-22 NOTE — PROGRESS NOTES
[facility]    Medicine Progress Note - Hospitalist Service    Date of Admission:  5/21/2025    Assessment & Plan      Ajay Bowden is a 79 year old male with PMHx significant for HTN, HLP, BPH, GOMEZ, anxiety and recent hernia repair, who was admitted on 5/21/2025 with UTI.     ED workup revealed: T 99.1, , VS otherwise stable. BMP unremarkable. CBC significant for WBC of 24.2. Lactic acid is normal. UA abnormal, large LE with >182 WBCs, mod blood. Urine and blood cultures sent. EKG sinus tachycardia. MRI lumbar spine obtained due to pain, no significant findings. CXR/rib film done due to reported fall a couple weeks ago and ongoing chest pain, no rib fxs per report.    Sepsis due to E coli UTI  Pt notes onset of bilateral upper leg pain mid day on 5/19 with associated weakness and low back pain. The pain has improved but now feels weak in both legs. He also developed significant urinary incontinence, which is unusual for him (he does dribble at baseline since TURP). He notes fever of 102 at home earlier today and took Tylenol.   He was given IV Rocephin in the ED.   - febrile up to 102.5 overnight   - continue IV Rocephin 2 grams daily   - s/p 1 liter NS overnight, limited intake but pushing fluids, give additional 1 liter LR on 5/22  - preliminary urine culture growing >100k E coli with susceptibilities pending  - blood cultures with NGTD (one blood culture was not obtained until after antibiotics were started)  - creatinine remains stable but ongoing leukocytosis of 22.3, follow BMP and CBC in AM  - instructed RN to have patient ambulate, if below baseline then would consult PT    HTN  Managed with spironolactone, labetalol, olmesartan and nifedipine  - hold PTA Olmesartan, and Spironolactone  - continue PTA Nifedipine and Labetalol for now with parameters     HLP  - hold statin, resume at discharge     BPH  S/p TURP in 2022    GOMEZ  Uses CPAP    Anxiety  - resume home Effexor    R  "inguinal hernia repair on 4/8/25       Observation Goals: -diagnostic tests and consults completed and resulted, -vital signs normal or at patient baseline, -tolerating oral antibiotics or has plans for home infusion setup, -infection is improving, -safe disposition plan has been identified, Nurse to notify provider when observation goals have been met and patient is ready for discharge.  Diet: Regular Diet Adult    DVT Prophylaxis: Pneumatic Compression Devices  Vásquez Catheter: Not present  Lines: None     Cardiac Monitoring: None  Code Status: Full Code      Clinically Significant Risk Factors Present on Admission                   # Hypertension: Noted on problem list           # Obesity: Estimated body mass index is 31.4 kg/m  as calculated from the following:    Height as of this encounter: 1.854 m (6' 1\").    Weight as of this encounter: 108 kg (238 lb).              Social Drivers of Health            Disposition Plan     Medically Ready for Discharge: Anticipated in 2-4 Days       The patient's care was discussed with the Bedside Nurse and Patient.    Chantell Hoover PA-C  Hospitalist Service  [facility]  Securely message with [email or web address] (more info)  Text page via [employer] Paging/Directory   ______________________________________________________________________    Interval History   Patient reports trying to drink plenty of fluids but with no appetite. He had chills and myalgias when he had his fever. Denies urinary urgency or frequency. He has mild dysuria. Denies significant back or abdominal pain.He reports feeling weaker than usual but has not been up out of bed today.     Physical Exam   Vital Signs: Temp: 100.4  F (38  C) Temp src: Oral BP: 138/77 Pulse: 95   Resp: 16 SpO2: 97 % O2 Device: None (Room air)    Weight: 238 lbs 0 oz    General Appearance: Initially sleeping in bed, wakes easily and responds to questions appropriately, NAD  Respiratory: CTAB without wheezing or rales "   Cardiovascular: RRR without murmur or rub  GI: soft, nontender, nondistended, normoactive bowel sounds   Skin: warm, dry   Ext: no LE edema      Medical Decision Making       40 MINUTES SPENT BY ME on the date of service doing chart review, history, exam, documentation & further activities per the note.      Data   ------------------------- PAST 24 HR DATA REVIEWED -----------------------------------------------

## 2025-05-22 NOTE — PLAN OF CARE
"Goal Outcome Evaluation:      Plan of Care Reviewed With: patient    Overall Patient Progress: improvingOverall Patient Progress: improving    Outcome Evaluation: A/o x4, VSS, RA, Intermittent tachycardia noted. Tolerating PO fluids. New PIV palcd due to IV infiltration. Sepsis due to E coli UTI,  tylenol x 1 for 100.8 fever at noon  Diet: Regular; tolerating oral fluids  Mobility: A1GBW; ambulated halls x 1  GI/: Continent of B&B; external cath overnight  Wound/Skin: WNL  Consults: Hospitalist  Discharge Plan: Pending     See Flow sheets for assessment, Denies SOB chest pain n/t/t in extremities.IV ABX given ceftriaxone.       Problem: Adult Inpatient Plan of Care  Goal: Plan of Care Review  Description: The Plan of Care Review/Shift note should be completed every shift.  The Outcome Evaluation is a brief statement about your assessment that the patient is improving, declining, or no change.  This information will be displayed automatically on your shift  note.  Outcome: Progressing  Flowsheets (Taken 5/22/2025 1824)  Outcome Evaluation:   A/o x4, VSS, RA, Intermittent tachycardia noted. Tolerating PO fluids. New PIV palcd due to IV infiltration. Sepsis due to E coli UTI,  tylenol x 1 for 100.8 fever at noon  Diet: Regular   tolerating oral fluids  Mobility: A1GBW   ambulated halls x 1  GI/: Continent of B&B   external cath overnight  Wound/Skin: WNL  Consults: Hospitalist  Discharge Plan: Pending     See Flow sheets for assessment,  Plan of Care Reviewed With: patient  Overall Patient Progress: improving  Goal: Patient-Specific Goal (Individualized)  Description: You can add care plan individualizations to a care plan. Examples of Individualization might be:  \"Parent requests to be called daily at 9am for status\", \"I have a hard time hearing out of my right ear\", or \"Do not touch me to wake me up as it startles  me\".  Outcome: Progressing  Goal: Absence of Hospital-Acquired Illness or Injury  Outcome: " Progressing  Intervention: Identify and Manage Fall Risk  Recent Flowsheet Documentation  Taken 5/22/2025 1600 by Betsy Turner RN  Safety Promotion/Fall Prevention: safety round/check completed  Intervention: Prevent Skin Injury  Recent Flowsheet Documentation  Taken 5/22/2025 1600 by Betsy Turner RN  Body Position: position changed independently  Skin Protection: adhesive use limited  Intervention: Prevent and Manage VTE (Venous Thromboembolism) Risk  Recent Flowsheet Documentation  Taken 5/22/2025 1600 by Betsy Turner RN  VTE Prevention/Management:   compression stockings off   foot pump device off   SCDs off (sequential compression devices)  Intervention: Prevent Infection  Recent Flowsheet Documentation  Taken 5/22/2025 1600 by Betsy Turner RN  Infection Prevention:   cohorting utilized   equipment surfaces disinfected   rest/sleep promoted   hand hygiene promoted  Goal: Optimal Comfort and Wellbeing  Outcome: Progressing  Intervention: Provide Person-Centered Care  Recent Flowsheet Documentation  Taken 5/22/2025 1600 by Betsy Turner RN  Trust Relationship/Rapport:   care explained   choices provided   questions answered   questions encouraged   thoughts/feelings acknowledged   reassurance provided   empathic listening provided   emotional support provided  Goal: Readiness for Transition of Care  Outcome: Progressing     Problem: Delirium  Goal: Optimal Coping  Outcome: Progressing  Goal: Improved Behavioral Control  Outcome: Progressing  Intervention: Minimize Safety Risk  Recent Flowsheet Documentation  Taken 5/22/2025 1600 by Betsy Turner RN  Enhanced Safety Measures:   room near unit station   review medications for side effects with activity  Trust Relationship/Rapport:   care explained   choices provided   questions answered   questions encouraged   thoughts/feelings acknowledged   reassurance provided   empathic listening provided   emotional support provided  Goal: Improved  Attention and Thought Clarity  Outcome: Progressing  Intervention: Maximize Cognitive Function  Recent Flowsheet Documentation  Taken 5/22/2025 1600 by Betsy Turner, RN  Sensory Stimulation Regulation: care clustered  Reorientation Measures:   calendar in view   clock in view   hearing device use encouraged  Goal: Improved Sleep  Outcome: Progressing     Problem: Infection  Goal: Absence of Infection Signs and Symptoms  Outcome: Progressing     Problem: UTI (Urinary Tract Infection)  Goal: Improved Infection Symptoms  Outcome: Progressing

## 2025-05-22 NOTE — PLAN OF CARE
"Diet: regular  Neuro: Alert and Oriented x4 but forgetful, [place]  C: HTN  R: room air  GI:distended and hypoactive bowel sounds   : external cath   MSK:assist of 1  IV: Infusion:NS at 100 ml/hr  ABX: rocephin   PPE: None  PAIN: 2/10 to BLE   Fever of 102 tylenol given and resolved     Goal Outcome Evaluation:      Plan of Care Reviewed With: patient    Overall Patient Progress: no changeOverall Patient Progress: no change    Outcome Evaluation: int confusion. ns @100    Problem: Adult Inpatient Plan of Care  Goal: Plan of Care Review  Description: The Plan of Care Review/Shift note should be completed every shift.  The Outcome Evaluation is a brief statement about your assessment that the patient is improving, declining, or no change.  This information will be displayed automatically on your shift  note.  Outcome: Not Progressing  Flowsheets (Taken 5/21/2025 2335)  Outcome Evaluation: int confusion. ns @100  Plan of Care Reviewed With: patient  Overall Patient Progress: no change  Goal: Patient-Specific Goal (Individualized)  Description: You can add care plan individualizations to a care plan. Examples of Individualization might be:  \"Parent requests to be called daily at 9am for status\", \"I have a hard time hearing out of my right ear\", or \"Do not touch me to wake me up as it startles  me\".  Outcome: Not Progressing  Goal: Absence of Hospital-Acquired Illness or Injury  Outcome: Not Progressing  Intervention: Identify and Manage Fall Risk  Recent Flowsheet Documentation  Taken 5/21/2025 2315 by Vanita Gooden RN  Safety Promotion/Fall Prevention:   room near nurse's station   room door open   lighting adjusted   clutter free environment maintained   activity supervised  Intervention: Prevent Infection  Recent Flowsheet Documentation  Taken 5/21/2025 2315 by Vanita Gooden RN  Infection Prevention:   hand hygiene promoted   rest/sleep promoted  Goal: Optimal Comfort and Wellbeing  Outcome: Not " Progressing  Goal: Readiness for Transition of Care  Outcome: Not Progressing     Problem: Delirium  Goal: Optimal Coping  Outcome: Not Progressing  Goal: Improved Behavioral Control  Outcome: Not Progressing  Intervention: Minimize Safety Risk  Recent Flowsheet Documentation  Taken 5/21/2025 2315 by Vanita Gooden RN  Enhanced Safety Measures: anti-tipping device on wheelchairs  Goal: Improved Attention and Thought Clarity  Outcome: Not Progressing  Intervention: Maximize Cognitive Function  Recent Flowsheet Documentation  Taken 5/21/2025 2315 by Vanita Gooden RN  Sensory Stimulation Regulation: auditory stimulation minimized  Reorientation Measures:   calendar in view   clock in view  Goal: Improved Sleep  Outcome: Not Progressing

## 2025-05-22 NOTE — PROGRESS NOTES
Summary: Sepsis due to E coli UTI   Orientation: A&O x 4  Vitals/Tele: VSS on RA; tylenol x 1 for 100.8 fever at noon  IV Access/drains: PIV infusing LR @ 100mL/hr  Diet: Regular; tolerating oral fluids  Mobility: A1GBW; ambulated halls x 1  GI/: Continent of B&B; external cath overnight  Wound/Skin: WNL  Consults: Hospitalist  Discharge Plan: Pending    See Flow sheets for assessment

## 2025-05-22 NOTE — PLAN OF CARE
"Observation goals  PRIOR TO DISCHARGE        Comments: -diagnostic tests and consults completed and resulted: No  -vital signs normal or at patient baseline: No  -tolerating oral antibiotics or has plans for home infusion setup: N/A  -infection is improving: Yes  -safe disposition plan has been identified: Yes   OUTPATIENT/OBSERVATION GOALS TO BE MET BEFORE DISCHARGE:  ADLs back to baseline: No  Activity and level of assistance: Up with standby assistance.  Pain status: Pain free.  Return to near baseline physical activity: Yes    Goal Outcome Evaluation:    Plan of Care Reviewed With: patient  Overall Patient Progress: improving  Outcome Evaluation: A&O x 4; VSS on RA - intermittent tachycardia; Afebrile this morning; Tolerating oral fluids    Problem: Adult Inpatient Plan of Care  Goal: Plan of Care Review  Description: The Plan of Care Review/Shift note should be completed every shift.  The Outcome Evaluation is a brief statement about your assessment that the patient is improving, declining, or no change.  This information will be displayed automatically on your shift  note.  5/22/2025 1059 by Tiny Meza RN  Outcome: Progressing  5/22/2025 1055 by Tiny Meza, RN  Outcome: Progressing  Flowsheets (Taken 5/22/2025 1053)  Outcome Evaluation:   A&O x 4   VSS on RA - intermittent tachycardia   Afebrile this morning   Tolerating oral fluids  Plan of Care Reviewed With: patient  Overall Patient Progress: improving  Goal: Patient-Specific Goal (Individualized)  Description: You can add care plan individualizations to a care plan. Examples of Individualization might be:  \"Parent requests to be called daily at 9am for status\", \"I have a hard time hearing out of my right ear\", or \"Do not touch me to wake me up as it startles  me\".  Outcome: Progressing  Goal: Absence of Hospital-Acquired Illness or Injury  Outcome: Progressing  Intervention: Identify and Manage Fall Risk  Recent Flowsheet Documentation  Taken " 5/22/2025 0812 by Tiny Meza RN  Safety Promotion/Fall Prevention:   room near nurse's station   safety round/check completed  Intervention: Prevent Skin Injury  Recent Flowsheet Documentation  Taken 5/22/2025 0812 by Tiny Meza RN  Body Position: position changed independently  Skin Protection: adhesive use limited  Intervention: Prevent Infection  Recent Flowsheet Documentation  Taken 5/22/2025 0812 by Tiny Meza RN  Infection Prevention:   rest/sleep promoted   hand hygiene promoted  Goal: Optimal Comfort and Wellbeing  Outcome: Progressing  Goal: Readiness for Transition of Care  Outcome: Progressing    Problem: Delirium  Goal: Optimal Coping  Outcome: Progressing  Goal: Improved Behavioral Control  Outcome: Progressing  Intervention: Minimize Safety Risk  Recent Flowsheet Documentation  Taken 5/22/2025 0812 by Tiny Meza RN  Enhanced Safety Measures:   room near unit station   review medications for side effects with activity  Goal: Improved Attention and Thought Clarity  Outcome: Progressing  Intervention: Maximize Cognitive Function  Recent Flowsheet Documentation  Taken 5/22/2025 0812 by Tiny Meza RN  Sensory Stimulation Regulation: care clustered  Reorientation Measures:   calendar in view   clock in view   hearing device use encouraged  Goal: Improved Sleep  Outcome: Progressing     Problem: UTI (Urinary Tract Infection)  Goal: Improved Infection Symptoms  Outcome: Progressing

## 2025-05-23 LAB
ANION GAP SERPL CALCULATED.3IONS-SCNC: 13 MMOL/L (ref 7–15)
ATRIAL RATE - MUSE: 84 BPM
BASOPHILS # BLD AUTO: 0 10E3/UL (ref 0–0.2)
BASOPHILS # BLD MANUAL: 0 10E3/UL (ref 0–0.2)
BASOPHILS NFR BLD AUTO: 0 %
BASOPHILS NFR BLD MANUAL: 0 %
BUN SERPL-MCNC: 21.1 MG/DL (ref 8–23)
CALCIUM SERPL-MCNC: 8.6 MG/DL (ref 8.8–10.4)
CHLORIDE SERPL-SCNC: 102 MMOL/L (ref 98–107)
CREAT SERPL-MCNC: 0.96 MG/DL (ref 0.67–1.17)
DIASTOLIC BLOOD PRESSURE - MUSE: NORMAL MMHG
EGFRCR SERPLBLD CKD-EPI 2021: 81 ML/MIN/1.73M2
EOSINOPHIL # BLD AUTO: 0.2 10E3/UL (ref 0–0.7)
EOSINOPHIL # BLD MANUAL: 0 10E3/UL (ref 0–0.7)
EOSINOPHIL NFR BLD AUTO: 1 %
EOSINOPHIL NFR BLD MANUAL: 0 %
ERYTHROCYTE [DISTWIDTH] IN BLOOD BY AUTOMATED COUNT: 13.9 % (ref 10–15)
ERYTHROCYTE [DISTWIDTH] IN BLOOD BY AUTOMATED COUNT: 14.2 % (ref 10–15)
GLUCOSE SERPL-MCNC: 101 MG/DL (ref 70–99)
HCO3 SERPL-SCNC: 21 MMOL/L (ref 22–29)
HCT VFR BLD AUTO: 36.4 % (ref 40–53)
HCT VFR BLD AUTO: 43.4 % (ref 40–53)
HGB BLD-MCNC: 12.8 G/DL (ref 13.3–17.7)
HGB BLD-MCNC: 15.1 G/DL (ref 13.3–17.7)
IMM GRANULOCYTES # BLD: 0.1 10E3/UL
IMM GRANULOCYTES NFR BLD: 1 %
INTERPRETATION ECG - MUSE: NORMAL
LYMPHOCYTES # BLD AUTO: 1.4 10E3/UL (ref 0.8–5.3)
LYMPHOCYTES # BLD MANUAL: 1.2 10E3/UL (ref 0.8–5.3)
LYMPHOCYTES NFR BLD AUTO: 9 %
LYMPHOCYTES NFR BLD MANUAL: 5 %
MCH RBC QN AUTO: 30 PG (ref 26.5–33)
MCH RBC QN AUTO: 30.5 PG (ref 26.5–33)
MCHC RBC AUTO-ENTMCNC: 34.8 G/DL (ref 31.5–36.5)
MCHC RBC AUTO-ENTMCNC: 35.2 G/DL (ref 31.5–36.5)
MCV RBC AUTO: 86 FL (ref 78–100)
MCV RBC AUTO: 87 FL (ref 78–100)
MONOCYTES # BLD AUTO: 1.9 10E3/UL (ref 0–1.3)
MONOCYTES # BLD MANUAL: 2.9 10E3/UL (ref 0–1.3)
MONOCYTES NFR BLD AUTO: 12 %
MONOCYTES NFR BLD MANUAL: 12 %
NEUTROPHILS # BLD AUTO: 12.2 10E3/UL (ref 1.6–8.3)
NEUTROPHILS # BLD MANUAL: 20.1 10E3/UL (ref 1.6–8.3)
NEUTROPHILS NFR BLD AUTO: 77 %
NEUTROPHILS NFR BLD MANUAL: 83 %
NRBC # BLD AUTO: 0 10E3/UL
NRBC BLD AUTO-RTO: 0 /100
P AXIS - MUSE: 28 DEGREES
PATH REV: ABNORMAL
PLAT MORPH BLD: NORMAL
PLAT MORPH BLD: NORMAL
PLATELET # BLD AUTO: 208 10E3/UL (ref 150–450)
PLATELET # BLD AUTO: 261 10E3/UL (ref 150–450)
POTASSIUM SERPL-SCNC: 3.4 MMOL/L (ref 3.4–5.3)
PR INTERVAL - MUSE: 164 MS
QRS DURATION - MUSE: 170 MS
QT - MUSE: 422 MS
QTC - MUSE: 498 MS
R AXIS - MUSE: -36 DEGREES
RBC # BLD AUTO: 4.19 10E6/UL (ref 4.4–5.9)
RBC # BLD AUTO: 5.04 10E6/UL (ref 4.4–5.9)
RBC MORPH BLD: NORMAL
RBC MORPH BLD: NORMAL
SODIUM SERPL-SCNC: 136 MMOL/L (ref 135–145)
SYSTOLIC BLOOD PRESSURE - MUSE: NORMAL MMHG
T AXIS - MUSE: -3 DEGREES
VENTRICULAR RATE- MUSE: 84 BPM
WBC # BLD AUTO: 15.7 10E3/UL (ref 4–11)
WBC # BLD AUTO: 24.2 10E3/UL (ref 4–11)

## 2025-05-23 PROCEDURE — 120N000004 HC R&B MS OVERFLOW

## 2025-05-23 PROCEDURE — 250N000013 HC RX MED GY IP 250 OP 250 PS 637: Performed by: PHYSICIAN ASSISTANT

## 2025-05-23 PROCEDURE — 93010 ELECTROCARDIOGRAM REPORT: CPT | Performed by: INTERNAL MEDICINE

## 2025-05-23 PROCEDURE — 99232 SBSQ HOSP IP/OBS MODERATE 35: CPT

## 2025-05-23 PROCEDURE — 85004 AUTOMATED DIFF WBC COUNT: CPT | Performed by: PHYSICIAN ASSISTANT

## 2025-05-23 PROCEDURE — 250N000011 HC RX IP 250 OP 636: Performed by: PHYSICIAN ASSISTANT

## 2025-05-23 PROCEDURE — 250N000013 HC RX MED GY IP 250 OP 250 PS 637

## 2025-05-23 PROCEDURE — 80048 BASIC METABOLIC PNL TOTAL CA: CPT | Performed by: PHYSICIAN ASSISTANT

## 2025-05-23 PROCEDURE — 36415 COLL VENOUS BLD VENIPUNCTURE: CPT | Performed by: PHYSICIAN ASSISTANT

## 2025-05-23 RX ADMIN — LABETALOL HYDROCHLORIDE 200 MG: 100 TABLET, FILM COATED ORAL at 20:12

## 2025-05-23 RX ADMIN — CEFTRIAXONE 2 G: 2 INJECTION, POWDER, FOR SOLUTION INTRAMUSCULAR; INTRAVENOUS at 17:27

## 2025-05-23 RX ADMIN — LABETALOL HYDROCHLORIDE 200 MG: 100 TABLET, FILM COATED ORAL at 09:05

## 2025-05-23 RX ADMIN — VENLAFAXINE HYDROCHLORIDE 75 MG: 75 CAPSULE, EXTENDED RELEASE ORAL at 09:05

## 2025-05-23 RX ADMIN — LOSARTAN POTASSIUM 100 MG: 25 TABLET, FILM COATED ORAL at 20:12

## 2025-05-23 RX ADMIN — NIFEDIPINE 90 MG: 90 TABLET, EXTENDED RELEASE ORAL at 09:19

## 2025-05-23 ASSESSMENT — ACTIVITIES OF DAILY LIVING (ADL)
ADLS_ACUITY_SCORE: 55
ADLS_ACUITY_SCORE: 60
ADLS_ACUITY_SCORE: 55
ADLS_ACUITY_SCORE: 60
ADLS_ACUITY_SCORE: 57
ADLS_ACUITY_SCORE: 57
ADLS_ACUITY_SCORE: 55
ADLS_ACUITY_SCORE: 60
ADLS_ACUITY_SCORE: 60
ADLS_ACUITY_SCORE: 55
ADLS_ACUITY_SCORE: 60
ADLS_ACUITY_SCORE: 55
ADLS_ACUITY_SCORE: 55
ADLS_ACUITY_SCORE: 60
ADLS_ACUITY_SCORE: 55
ADLS_ACUITY_SCORE: 55

## 2025-05-23 NOTE — PROGRESS NOTES
[facility]    Medicine Progress Note - Hospitalist Service    Date of Admission:  5/21/2025    Assessment & Plan   Ajay Bowden is a 79 year old male with PMHx significant for HTN, HLP, BPH, GOMEZ, anxiety and recent hernia repair, who was admitted on 5/21/2025 with UTI.      ED workup revealed: T 99.1, , VS otherwise stable. BMP unremarkable. CBC significant for WBC of 24.2. Lactic acid is normal. UA abnormal, large LE with >182 WBCs, mod blood. Urine and blood cultures sent. EKG sinus tachycardia. MRI lumbar spine obtained due to pain, no significant findings. CXR/rib film done due to reported fall a couple weeks ago and ongoing chest pain, no rib fxs per report.    Daily update: Leukocytosis is slowly improving.  Plan on 1 more day of IV antibiotics and hopeful for discharge in a.m. if labs continue to improve.     Sepsis due to E coli UTI  Pt notes onset of bilateral upper leg pain mid day on 5/19 with associated weakness and low back pain. The pain has improved but now feels weak in both legs. He also developed significant urinary incontinence, which is unusual for him (he does dribble at baseline since TURP). He notes fever of 102 at home earlier today and took Tylenol. He was given IV Rocephin in the ED.   - Last fever 5/22 mid day of 100.8  - continue IV Rocephin 2 grams daily   - s/p 1 liter NS 5/21, limited intake but pushing fluids, give additional 1 liter LR on 5/22  - urine culture growing >100k E coli: Resistance to ampicillin, Bactrim  - blood cultures NGTD (one blood culture was not obtained until after antibiotics were started)  - leukocytosis slowly improving 22.3 -> 15.7  - ambulating with walker in hallway, holding off on PT      HTN  Managed with spironolactone, labetalol, olmesartan and nifedipine  - hold PTA Spironolactone, likely resume tomorrow if BP stable, resumed losartan today  - continue PTA olmesartan, Nifedipine and Labetalol with parameters      HLP  -  "resume at discharge      BPH  S/p TURP in 2022     GOMEZ  Uses CPAP     Anxiety  - resume home Effexor     R inguinal hernia repair on 4/8/25          Diet: Regular Diet Adult    DVT Prophylaxis: Pneumatic Compression Devices and Ambulate every shift  Vásquez Catheter: Not present  Lines: None     Cardiac Monitoring: None  Code Status: Full Code      Clinically Significant Risk Factors Present on Admission                   # Hypertension: Noted on problem list           # Obesity: Estimated body mass index is 31.4 kg/m  as calculated from the following:    Height as of this encounter: 1.854 m (6' 1\").    Weight as of this encounter: 108 kg (238 lb).              Social Drivers of Health            Disposition Plan     Medically Ready for Discharge: Anticipated Tomorrow       The patient's care was discussed with the Bedside Nurse, Patient, and Patient's Family.    BRITTNEY Chauhan PA-C  Hospitalist Service  [facility]  Securely message with [employer] (more info)  Text page via [employer] Paging/Directory   ______________________________________________________________________    Interval History   Feels like he is continuing to improve.  Denying any fevers overnight, chills, abdominal pain, flank pain, nausea or vomiting.  Tolerating Po intake. Voiding adequately     Physical Exam   Vital Signs: Temp: 97.6  F (36.4  C) Temp src: Oral BP: 132/83 Pulse: 104   Resp: 18 SpO2: 97 % O2 Device: None (Room air)    Weight: 238 lbs 0 oz    GENERAL:  Alert, Comfortable, No acute distress. Laying in bed, ambulating in hallway with walker  PSYCH: pleasant, oriented.  HEART:  Normal S1, S2 with no murmur, RRR  LUNGS:  Normal Respiratory effort. Clear to auscultation bilaterally with no wheezing, rales or ronchi.  ABDOMEN:  Soft, non-tender, non distended. No peritoneal signs.   SKIN:  Warm, dry to touch. No rash.  NEUROLOGIC: Speech clear, alert & orientated x 4, no focal deficits.     Medical Decision Making "       MANAGEMENT DISCUSSED with the following over the past 24 hours: patient, family, nursing   NOTE(S)/MEDICAL RECORDS REVIEWED over the past 24 hours: labs, progress notes      Data     I have personally reviewed the following data over the past 24 hrs:    15.7 (H)  \   12.8 (L)   / 208     136 102 21.1 /  101 (H)   3.4 21 (L) 0.96 \       Imaging results reviewed over the past 24 hrs:   No results found for this or any previous visit (from the past 24 hours).

## 2025-05-23 NOTE — PLAN OF CARE
"Goal Outcome Evaluation:      Plan of Care Reviewed With: patient    Overall Patient Progress: improvingOverall Patient Progress: improving     PRIMARY DIAGNOSIS: ACUTE PAIN  OUTPATIENT/OBSERVATION GOALS TO BE MET BEFORE DISCHARGE:  1. Pain Status: Pain free.    2. Return to near baseline physical activity: No    3. Cleared for discharge by consultants (if involved): No    Discharge Planner Nurse   Safe discharge environment identified: No  Barriers to discharge: Yes       Entered by: Rizwan Olmedo RN 05/22/2025 9:33 PM   Vitals are Temp: 98.1  F (36.7  C) Temp src: Oral BP: 134/80 Pulse: 102   Resp: 18 SpO2: 96 %.  Patient is Alert and Oriented x4. denying pain.  Patient has Lactated Ringer's running at 100 mL per hour.    Pt is a Regular diet. He is 1 Assist with Gait Belt and Walker. Vss on room air, male external cath in place, has 4 lap sites to his abdomen from a procedure in July 2024,   Please review provider order for any additional goals.   Nurse to notify provider when observation goals have been met and patient is ready for discharge.    Problem: Adult Inpatient Plan of Care  Goal: Plan of Care Review  Description: The Plan of Care Review/Shift note should be completed every shift.  The Outcome Evaluation is a brief statement about your assessment that the patient is improving, declining, or no change.  This information will be displayed automatically on your shift  note.  Outcome: Progressing  Flowsheets (Taken 5/22/2025 2132)  Plan of Care Reviewed With: patient  Overall Patient Progress: improving  Goal: Patient-Specific Goal (Individualized)  Description: You can add care plan individualizations to a care plan. Examples of Individualization might be:  \"Parent requests to be called daily at 9am for status\", \"I have a hard time hearing out of my right ear\", or \"Do not touch me to wake me up as it startles  me\".  Outcome: Progressing  Goal: Absence of Hospital-Acquired Illness or Injury  Outcome: " Progressing  Intervention: Identify and Manage Fall Risk  Recent Flowsheet Documentation  Taken 5/22/2025 1937 by Rizwan Olmedo RN  Safety Promotion/Fall Prevention: safety round/check completed  Intervention: Prevent and Manage VTE (Venous Thromboembolism) Risk  Recent Flowsheet Documentation  Taken 5/22/2025 1937 by Rizwan Olmedo RN  VTE Prevention/Management:   compression stockings off   foot pump device off   SCDs off (sequential compression devices)  Intervention: Prevent Infection  Recent Flowsheet Documentation  Taken 5/22/2025 1937 by Rizwan Olmedo RN  Infection Prevention:   cohorting utilized   equipment surfaces disinfected   rest/sleep promoted   hand hygiene promoted  Goal: Optimal Comfort and Wellbeing  Outcome: Progressing  Goal: Readiness for Transition of Care  Outcome: Progressing     Problem: Delirium  Goal: Optimal Coping  Outcome: Progressing  Goal: Improved Behavioral Control  Outcome: Progressing  Intervention: Minimize Safety Risk  Recent Flowsheet Documentation  Taken 5/22/2025 1937 by Rizwan Olmedo RN  Enhanced Safety Measures:   room near unit station   review medications for side effects with activity  Goal: Improved Attention and Thought Clarity  Outcome: Progressing  Goal: Improved Sleep  Outcome: Progressing     Problem: Infection  Goal: Absence of Infection Signs and Symptoms  Outcome: Progressing     Problem: UTI (Urinary Tract Infection)  Goal: Improved Infection Symptoms  Outcome: Progressing

## 2025-05-23 NOTE — PLAN OF CARE
Goal Outcome Evaluation:      Plan of Care Reviewed With: patient    Overall Patient Progress: improvingOverall Patient Progress: improving    Outcome Evaluation: Pt a/o x 4, VSS, Tolerating po and meds able to walk in hallways with w/g and stand by. Pt assist in shower and tolerated well.

## 2025-05-24 VITALS
DIASTOLIC BLOOD PRESSURE: 79 MMHG | HEIGHT: 73 IN | RESPIRATION RATE: 16 BRPM | SYSTOLIC BLOOD PRESSURE: 127 MMHG | TEMPERATURE: 98.6 F | BODY MASS INDEX: 31.54 KG/M2 | OXYGEN SATURATION: 96 % | HEART RATE: 91 BPM | WEIGHT: 238 LBS

## 2025-05-24 LAB
ANION GAP SERPL CALCULATED.3IONS-SCNC: 14 MMOL/L (ref 7–15)
BASOPHILS # BLD AUTO: 0.1 10E3/UL (ref 0–0.2)
BASOPHILS NFR BLD AUTO: 1 %
BUN SERPL-MCNC: 21.6 MG/DL (ref 8–23)
CALCIUM SERPL-MCNC: 9.1 MG/DL (ref 8.8–10.4)
CHLORIDE SERPL-SCNC: 104 MMOL/L (ref 98–107)
CREAT SERPL-MCNC: 0.92 MG/DL (ref 0.67–1.17)
EGFRCR SERPLBLD CKD-EPI 2021: 86 ML/MIN/1.73M2
EOSINOPHIL # BLD AUTO: 0.3 10E3/UL (ref 0–0.7)
EOSINOPHIL NFR BLD AUTO: 4 %
ERYTHROCYTE [DISTWIDTH] IN BLOOD BY AUTOMATED COUNT: 13.7 % (ref 10–15)
GLUCOSE SERPL-MCNC: 115 MG/DL (ref 70–99)
HCO3 SERPL-SCNC: 21 MMOL/L (ref 22–29)
HCT VFR BLD AUTO: 37.8 % (ref 40–53)
HGB BLD-MCNC: 13 G/DL (ref 13.3–17.7)
IMM GRANULOCYTES # BLD: 0.1 10E3/UL
IMM GRANULOCYTES NFR BLD: 1 %
LYMPHOCYTES # BLD AUTO: 1.1 10E3/UL (ref 0.8–5.3)
LYMPHOCYTES NFR BLD AUTO: 13 %
MCH RBC QN AUTO: 29.7 PG (ref 26.5–33)
MCHC RBC AUTO-ENTMCNC: 34.4 G/DL (ref 31.5–36.5)
MCV RBC AUTO: 87 FL (ref 78–100)
MONOCYTES # BLD AUTO: 1.3 10E3/UL (ref 0–1.3)
MONOCYTES NFR BLD AUTO: 15 %
NEUTROPHILS # BLD AUTO: 5.8 10E3/UL (ref 1.6–8.3)
NEUTROPHILS NFR BLD AUTO: 68 %
NRBC # BLD AUTO: 0 10E3/UL
NRBC BLD AUTO-RTO: 0 /100
PLATELET # BLD AUTO: 280 10E3/UL (ref 150–450)
POTASSIUM SERPL-SCNC: 3.7 MMOL/L (ref 3.4–5.3)
RBC # BLD AUTO: 4.37 10E6/UL (ref 4.4–5.9)
SODIUM SERPL-SCNC: 139 MMOL/L (ref 135–145)
WBC # BLD AUTO: 8.6 10E3/UL (ref 4–11)

## 2025-05-24 PROCEDURE — 250N000013 HC RX MED GY IP 250 OP 250 PS 637: Performed by: PHYSICIAN ASSISTANT

## 2025-05-24 PROCEDURE — 85025 COMPLETE CBC W/AUTO DIFF WBC: CPT

## 2025-05-24 PROCEDURE — 80048 BASIC METABOLIC PNL TOTAL CA: CPT

## 2025-05-24 PROCEDURE — 250N000013 HC RX MED GY IP 250 OP 250 PS 637: Performed by: INTERNAL MEDICINE

## 2025-05-24 PROCEDURE — 36415 COLL VENOUS BLD VENIPUNCTURE: CPT

## 2025-05-24 PROCEDURE — 82374 ASSAY BLOOD CARBON DIOXIDE: CPT

## 2025-05-24 PROCEDURE — 99239 HOSP IP/OBS DSCHRG MGMT >30: CPT | Performed by: INTERNAL MEDICINE

## 2025-05-24 RX ORDER — POTASSIUM CHLORIDE 1500 MG/1
40 TABLET, EXTENDED RELEASE ORAL ONCE
Status: COMPLETED | OUTPATIENT
Start: 2025-05-24 | End: 2025-05-24

## 2025-05-24 RX ORDER — CIPROFLOXACIN 500 MG/1
500 TABLET, FILM COATED ORAL 2 TIMES DAILY
Qty: 10 TABLET | Refills: 0 | Status: SHIPPED | OUTPATIENT
Start: 2025-05-24 | End: 2025-05-29

## 2025-05-24 RX ADMIN — NIFEDIPINE 90 MG: 90 TABLET, EXTENDED RELEASE ORAL at 08:16

## 2025-05-24 RX ADMIN — POTASSIUM CHLORIDE 40 MEQ: 1500 TABLET, EXTENDED RELEASE ORAL at 08:14

## 2025-05-24 RX ADMIN — VENLAFAXINE HYDROCHLORIDE 75 MG: 75 CAPSULE, EXTENDED RELEASE ORAL at 08:14

## 2025-05-24 RX ADMIN — LABETALOL HYDROCHLORIDE 200 MG: 100 TABLET, FILM COATED ORAL at 08:14

## 2025-05-24 ASSESSMENT — ACTIVITIES OF DAILY LIVING (ADL)
ADLS_ACUITY_SCORE: 57
ADLS_ACUITY_SCORE: 53
ADLS_ACUITY_SCORE: 57
ADLS_ACUITY_SCORE: 33
ADLS_ACUITY_SCORE: 57
ADLS_ACUITY_SCORE: 53
ADLS_ACUITY_SCORE: 57

## 2025-05-24 NOTE — PLAN OF CARE
"Goal Outcome Evaluation:      Plan of Care Reviewed With: patient    Overall Patient Progress: improvingOverall Patient Progress: improving    Outcome Evaluation: Pt AOx4, pleasant. Denies pain, nausea, vomitting. Afebrile. Up SBA w walker. Afebrile. IV SL. Last BM 2d ago - denies wanting any stool softner at this time. Voiding. Hopeful for discharge today.    Addendum: Discharging to home via wife transport. Both aware of ABX schedule. First dose tonight. Meds filled here. Pt will follow up with PCP. PIV out.    Problem: Adult Inpatient Plan of Care  Goal: Plan of Care Review  Description: The Plan of Care Review/Shift note should be completed every shift.  The Outcome Evaluation is a brief statement about your assessment that the patient is improving, declining, or no change.  This information will be displayed automatically on your shift  note.  Outcome: Progressing  Flowsheets (Taken 5/24/2025 1042)  Outcome Evaluation: Pt AOx4, pleasant. Denies pain, nausea, vomitting. Afebrile. Up SBA w walker. Afebrile. IV SL. Last BM 2d ago - denies wanting any stool softner at this time. Voiding. Hopeful for discharge today.  Plan of Care Reviewed With: patient  Overall Patient Progress: improving  Goal: Patient-Specific Goal (Individualized)  Description: You can add care plan individualizations to a care plan. Examples of Individualization might be:  \"Parent requests to be called daily at 9am for status\", \"I have a hard time hearing out of my right ear\", or \"Do not touch me to wake me up as it startles  me\".  Outcome: Progressing  Goal: Absence of Hospital-Acquired Illness or Injury  Outcome: Progressing  Intervention: Prevent Skin Injury  Recent Flowsheet Documentation  Taken 5/24/2025 0814 by Vern Mccoy, RN  Body Position: position changed independently  Intervention: Prevent Infection  Recent Flowsheet Documentation  Taken 5/24/2025 0814 by Vern Mccoy, RN  Infection Prevention: rest/sleep promoted  Goal: " Optimal Comfort and Wellbeing  Outcome: Progressing  Goal: Readiness for Transition of Care  Outcome: Progressing     Problem: Delirium  Goal: Optimal Coping  Outcome: Progressing  Goal: Improved Behavioral Control  Outcome: Progressing  Goal: Improved Attention and Thought Clarity  Outcome: Progressing  Goal: Improved Sleep  Outcome: Progressing     Problem: Infection  Goal: Absence of Infection Signs and Symptoms  Outcome: Progressing     Problem: UTI (Urinary Tract Infection)  Goal: Improved Infection Symptoms  Outcome: Progressing

## 2025-05-24 NOTE — PLAN OF CARE
"Goal Outcome Evaluation:      Plan of Care Reviewed With: patient    Overall Patient Progress: improvingOverall Patient Progress: improving    A&Ox4. VSS. Tmax 99.1. On RA. Pt denies pain. Tolerating regular diet. PIV saline locked. Voiding.  Incontinence overnight, brief in place. SBA with walker.   Problem: Adult Inpatient Plan of Care  Goal: Plan of Care Review  Description: The Plan of Care Review/Shift note should be completed every shift.  The Outcome Evaluation is a brief statement about your assessment that the patient is improving, declining, or no change.  This information will be displayed automatically on your shift  note.  Outcome: Progressing  Flowsheets (Taken 5/24/2025 0507)  Plan of Care Reviewed With: patient  Overall Patient Progress: improving  Goal: Patient-Specific Goal (Individualized)  Description: You can add care plan individualizations to a care plan. Examples of Individualization might be:  \"Parent requests to be called daily at 9am for status\", \"I have a hard time hearing out of my right ear\", or \"Do not touch me to wake me up as it startles  me\".  Outcome: Progressing  Goal: Absence of Hospital-Acquired Illness or Injury  Outcome: Progressing  Intervention: Identify and Manage Fall Risk  Recent Flowsheet Documentation  Taken 5/23/2025 2020 by Cipriano Argueta RN  Safety Promotion/Fall Prevention:   activity supervised   assistive device/personal items within reach   clutter free environment maintained   nonskid shoes/slippers when out of bed   patient and family education   room near nurse's station   room organization consistent   safety round/check completed   treat underlying cause  Intervention: Prevent Skin Injury  Recent Flowsheet Documentation  Taken 5/24/2025 0225 by Cipriano Argueta RN  Body Position: position changed independently  Taken 5/23/2025 2020 by Cipriano Argueta RN  Body Position: position changed independently  Skin Protection:   adhesive use limited   tubing/devices " free from skin contact  Intervention: Prevent and Manage VTE (Venous Thromboembolism) Risk  Recent Flowsheet Documentation  Taken 5/23/2025 2020 by Cipriano Argueta RN  VTE Prevention/Management:   compression stockings off   foot pump device off   SCDs off (sequential compression devices)  Intervention: Prevent Infection  Recent Flowsheet Documentation  Taken 5/23/2025 2020 by Cipriano Argueta RN  Infection Prevention:   cohorting utilized   equipment surfaces disinfected   hand hygiene promoted   rest/sleep promoted   single patient room provided  Goal: Optimal Comfort and Wellbeing  Outcome: Progressing  Intervention: Provide Person-Centered Care  Recent Flowsheet Documentation  Taken 5/23/2025 2020 by Cipriano Argueta RN  Trust Relationship/Rapport:   care explained   choices provided   questions answered   questions encouraged   thoughts/feelings acknowledged   reassurance provided   empathic listening provided   emotional support provided  Goal: Readiness for Transition of Care  Outcome: Progressing     Problem: Delirium  Goal: Optimal Coping  Outcome: Progressing  Goal: Improved Behavioral Control  Outcome: Progressing  Intervention: Minimize Safety Risk  Recent Flowsheet Documentation  Taken 5/23/2025 2020 by Cipriano Argueta RN  Enhanced Safety Measures:   assistive devices when indicated   patient/family teach back on injury risk  Trust Relationship/Rapport:   care explained   choices provided   questions answered   questions encouraged   thoughts/feelings acknowledged   reassurance provided   empathic listening provided   emotional support provided  Goal: Improved Attention and Thought Clarity  Outcome: Progressing  Intervention: Maximize Cognitive Function  Recent Flowsheet Documentation  Taken 5/23/2025 2020 by Cipriano Argueta RN  Sensory Stimulation Regulation: care clustered  Reorientation Measures:   calendar in view   clock in view   hearing device use encouraged  Goal: Improved Sleep  Outcome:  Progressing  Intervention: Promote Sleep  Recent Flowsheet Documentation  Taken 5/23/2025 2020 by Cipriano Argueta RN  Sleep/Rest Enhancement:   awakenings minimized   comfort measures   regular sleep/rest pattern promoted     Problem: Infection  Goal: Absence of Infection Signs and Symptoms  Outcome: Progressing     Problem: UTI (Urinary Tract Infection)  Goal: Improved Infection Symptoms  Outcome: Progressing  Intervention: Facilitate Optimal Urinary Elimination  Recent Flowsheet Documentation  Taken 5/23/2025 2020 by Cipriano Argueta RN  Urinary Elimination Promotion:   absorbent pad/diaper use encouraged   frequent voiding encouraged   toileting offered

## 2025-05-24 NOTE — DISCHARGE SUMMARY
"[facility]  Hospitalist Discharge Summary      Date of Admission:  5/21/2025  Date of Discharge:  5/24/2025  Discharging Provider: El Shaikh MD  Discharge Service: Hospitalist Service    Discharge Diagnoses   Please see hospital course below    Clinically Significant Risk Factors     # Obesity: Estimated body mass index is 31.4 kg/m  as calculated from the following:    Height as of this encounter: 1.854 m (6' 1\").    Weight as of this encounter: 108 kg (238 lb).       Follow-ups Needed After Discharge   Follow-up Appointments       Hospital Follow-up with Existing Primary Care Provider (PCP)          Schedule Primary Care visit within: 30 Days               Unresulted Labs Ordered in the Past 30 Days of this Admission       Date and Time Order Name Status Description    5/21/2025  5:23 PM Blood Culture Peripheral blood (BC) Arm, Left Preliminary     5/21/2025  5:23 PM Blood Culture Peripheral blood (BC) Hand, Left Preliminary             Discharge Disposition   Discharged to home  Condition at discharge: Stable    Hospital Course   Ajay Bowden is a 79 year old male with PMHx significant for HTN, HLP, BPH, GOMEZ, anxiety and recent hernia repair, who was admitted on 5/21/2025 with UTI.      ED workup revealed: T 99.1, , VS otherwise stable. BMP unremarkable. CBC significant for WBC of 24.2. Lactic acid is normal. UA abnormal, large LE with >182 WBCs, mod blood. Urine and blood cultures sent. EKG sinus tachycardia. MRI lumbar spine obtained due to pain, no significant findings. CXR/rib film done due to reported fall a couple weeks ago and ongoing chest pain, no rib fxs per report.     Sepsis due to E coli UTI  Patient noted onset of bilateral upper leg pain on 5/19 with associated weakness and low back pain. The pain has improved but ten felt weak in both legs. He also developed significant urinary incontinence, which is unusual for him, but sometimes dribbles after he is  " TURP. He also noted fever or 102 at home on the day of presentation, took Tylenol.    - Patient is given IV Rocephin and IV fluids in ED.  - Urine culture grew E. coli resistant to ampicillin and Bactrim.  - Fever resolved, his energy level also improved .  - Blood culture remain negative .  - Leukocytosis resolved with and within normal limits .  - Overall he felt better, was ambulating on the hallway independently .  - His energy level significantly improved .  - Overall he did well and discharged in stable condition on ciprofloxacin 500 mg twice a day for 5 more days .  - I had a long discussion with patient and with his wife at bedside.  All their question and concerns addressed .    Chronic medical conditions: Patient will resume his PTA meds  HTN  HLP  BPH S/p TURP in 2022  GOMEZ Uses CPAP  Anxiety  R inguinal hernia repair on 4/8/25    Consultations This Hospital Stay   None    Code Status   Full Code    Time Spent on this Encounter   I, El Shaikh MD, personally saw the patient today and spent greater than 30 minutes discharging this patient.       El Shaikh MD  [facility]  [address] [place] [address]  Phone: [phone]  Fax: [phone]  ______________________________________________________________________    Physical Exam   Vital Signs: Temp: 98.6  F (37  C) Temp src: Oral BP: 127/79 Pulse: 91   Resp: 16 SpO2: 96 % O2 Device: None (Room air)    Weight: 238 lbs 0 oz    GENERAL:  Alert, Comfortable, No acute distress. Laying in bed, ambulating in hallway with walker  PSYCH: pleasant, oriented.  HEART:  Normal S1, S2 with no murmur, RRR  LUNGS:  Normal Respiratory effort. Clear to auscultation bilaterally with no wheezing, rales or ronchi.  ABDOMEN:  Soft, non-tender, non distended. No peritoneal signs.   SKIN:  Warm, dry to touch. No rash.  NEUROLOGIC: Speech clear, alert & orientated x 4, no focal deficits.       Primary Care Physician    [facility]    Discharge Orders      Reason for your hospital stay    E. coli UTI, sepsis     Activity    Your activity upon discharge: activity as tolerated     Diet    Follow this diet upon discharge: Current Diet:Orders Placed This Encounter      Regular Diet Adult     Hospital Follow-up with Existing Primary Care Provider (PCP)            Significant Results and Procedures   Most Recent 3 CBC's:  Recent Labs   Lab Test 05/24/25  0913 05/23/25  0627 05/22/25  0602   WBC 8.6 15.7* 22.3*   HGB 13.0* 12.8* 13.2*   MCV 87 87 88    208 206     Most Recent 3 BMP's:  Recent Labs   Lab Test 05/24/25  0913 05/23/25  0627 05/22/25  0602    136 140   POTASSIUM 3.7 3.4 3.8   CHLORIDE 104 102 105   CO2 21* 21* 22   BUN 21.6 21.1 19.0   CR 0.92 0.96 1.12   ANIONGAP 14 13 13   YASIR 9.1 8.6* 8.7*   * 101* 119*     Most Recent 2 LFT's:No lab results found.,   Results for orders placed or performed during the hospital encounter of 05/21/25   Lumbar spine MRI w/o contrast    Narrative    EXAM: MR LUMBAR SPINE W/O CONTRAST  LOCATION: [facility]  DATE: 5/21/2025    INDICATION: new incontinence, bilateral leg weakness, fever  COMPARISON: CT abdomen pelvis 04/02/2025.  TECHNIQUE: Routine Lumbar Spine MRI without IV contrast.    FINDINGS:   Nomenclature is based on 5 lumbar type vertebral bodies. Vertebral body heights are maintained. There is normal alignment of the lumbar spine. Diffusely heterogeneous bone marrow signal without evidence of focal aggressive lesion. Scattered multilevel   small intraosseous hemangiomas. Normal distal spinal cord and cauda equina with conus medullaris at L1. Simple appearing bilateral renal cysts, better characterized on the prior CT abdomen pelvis. Unremarkable visualized bony pelvis.    T12-L1: Normal disc height. No disc bulge or spinal canal stenosis. No facet arthropathy. No neural foraminal stenosis.     L1-L2: Normal disc height. Trace disc  bulge. No spinal canal stenosis. Mild bilateral facet arthropathy. No neural foraminal stenosis.    L2-L3: Normal disc height. Trace disc bulge. No spinal canal stenosis. Mild bilateral facet arthropathy. No neural foraminal stenosis.     L3-L4: Mild intervertebral disc height loss. Mild disc bulge and ligamentum flavum thickening results in mild spinal canal stenosis and mild bilateral lateral recess stenosis. There is moderate bilateral facet arthropathy. Moderate right neural foraminal   stenosis and mild left neural foraminal stenosis.    L4-L5: Moderate intervertebral disc height loss and associated Modic type II endplate degenerative signal changes. Mild disc bulge. No spinal canal stenosis. Moderate bilateral facet arthropathy. Mild-to-moderate left neural foraminal stenosis. Mild   right neural foraminal stenosis.    L5-S1: Mild intervertebral disc height loss. No disc bulge or spinal canal stenosis. Mild bilateral facet arthropathy. No neural foraminal stenosis.      Impression    IMPRESSION:  1.  Multilevel degenerative changes of the lumbar spine detailed above. No high-grade spinal canal stenosis or cauda equina nerve root compression.  2.  Moderate right neural foraminal stenosis at L3-L4.     Ribs XR, unilat 3 views + PA chest, right    Narrative    EXAM: XR RIBS and CHEST RT 3VW  LOCATION: [facility]  DATE: 5/21/2025    INDICATION: fall two weeks ago, rib pain  COMPARISON: None.      Impression    IMPRESSION: The visualized heart and lungs are negative. No displaced rib fractures.       Discharge Medications   Current Discharge Medication List        START taking these medications    Details   ciprofloxacin (CIPRO) 500 MG tablet Take 1 tablet (500 mg) by mouth 2 times daily for 5 days.  Qty: 10 tablet, Refills: 0    Associated Diagnoses: Acute cystitis without hematuria           CONTINUE these medications which have NOT CHANGED    Details   acetaminophen (TYLENOL) 500 MG tablet  Take 500 mg by mouth every 6 hours as needed for mild pain.      atorvastatin (LIPITOR) 20 MG tablet Take 20 mg by mouth every evening.      labetalol (NORMODYNE) 200 MG tablet Take 200 mg by mouth 2 times daily.      NIFEdipine ER OSMOTIC (PROCARDIA XL) 90 MG 24 hr tablet Take 90 mg by mouth daily.      olmesartan (BENICAR) 40 MG tablet Take 40 mg by mouth every evening.      polyethylene glycol (MIRALAX) 17 g packet Take 1 packet by mouth daily as needed for constipation.      spironolactone (ALDACTONE) 12.5 mg TABS half-tab Take 12.5 mg by mouth daily.      venlafaxine (EFFEXOR XR) 75 MG 24 hr capsule Take 75 mg by mouth daily.           Allergies   Allergies   Allergen Reactions    Hydrochlorothiazide Other (See Comments)     Stopped 7/2012 due to urinary freq and urgency

## 2025-05-26 LAB — BACTERIA SPEC CULT: NO GROWTH

## 2025-05-27 LAB — BACTERIA SPEC CULT: NO GROWTH

## 2025-07-10 ENCOUNTER — APPOINTMENT (OUTPATIENT)
Dept: CT IMAGING | Facility: CLINIC | Age: 80
End: 2025-07-10
Attending: EMERGENCY MEDICINE
Payer: COMMERCIAL

## 2025-07-10 ENCOUNTER — APPOINTMENT (OUTPATIENT)
Dept: CARDIOLOGY | Facility: CLINIC | Age: 80
End: 2025-07-10
Attending: INTERNAL MEDICINE
Payer: COMMERCIAL

## 2025-07-10 ENCOUNTER — HOSPITAL ENCOUNTER (INPATIENT)
Facility: CLINIC | Age: 80
End: 2025-07-10
Attending: EMERGENCY MEDICINE | Admitting: INTERNAL MEDICINE
Payer: COMMERCIAL

## 2025-07-10 VITALS
TEMPERATURE: 97.3 F | OXYGEN SATURATION: 95 % | RESPIRATION RATE: 18 BRPM | SYSTOLIC BLOOD PRESSURE: 156 MMHG | HEART RATE: 84 BPM | DIASTOLIC BLOOD PRESSURE: 95 MMHG | WEIGHT: 226.63 LBS | BODY MASS INDEX: 30.04 KG/M2 | HEIGHT: 73 IN

## 2025-07-10 DIAGNOSIS — S22.31XA CLOSED FRACTURE OF ONE RIB OF RIGHT SIDE, INITIAL ENCOUNTER: Primary | ICD-10-CM

## 2025-07-10 DIAGNOSIS — S22.41XA CLOSED FRACTURE OF MULTIPLE RIBS OF RIGHT SIDE, INITIAL ENCOUNTER: ICD-10-CM

## 2025-07-10 DIAGNOSIS — S32.018A OTHER CLOSED FRACTURE OF FIRST LUMBAR VERTEBRA, INITIAL ENCOUNTER (H): ICD-10-CM

## 2025-07-10 LAB
ALBUMIN SERPL BCG-MCNC: 4.2 G/DL (ref 3.5–5.2)
ALBUMIN UR-MCNC: NEGATIVE MG/DL
ALP SERPL-CCNC: 66 U/L (ref 40–150)
ALT SERPL W P-5'-P-CCNC: 29 U/L (ref 0–70)
ANION GAP SERPL CALCULATED.3IONS-SCNC: 12 MMOL/L (ref 7–15)
APPEARANCE UR: CLEAR
AST SERPL W P-5'-P-CCNC: 27 U/L (ref 0–45)
BASOPHILS # BLD AUTO: 0 10E3/UL (ref 0–0.2)
BASOPHILS NFR BLD AUTO: 1 %
BILIRUB SERPL-MCNC: 0.4 MG/DL
BILIRUB UR QL STRIP: NEGATIVE
BUN SERPL-MCNC: 15.9 MG/DL (ref 8–23)
CALCIUM SERPL-MCNC: 9.5 MG/DL (ref 8.8–10.4)
CHLORIDE SERPL-SCNC: 105 MMOL/L (ref 98–107)
COLOR UR AUTO: ABNORMAL
CREAT SERPL-MCNC: 0.94 MG/DL (ref 0.67–1.17)
EGFRCR SERPLBLD CKD-EPI 2021: 83 ML/MIN/1.73M2
EOSINOPHIL # BLD AUTO: 0.4 10E3/UL (ref 0–0.7)
EOSINOPHIL NFR BLD AUTO: 5 %
ERYTHROCYTE [DISTWIDTH] IN BLOOD BY AUTOMATED COUNT: 14 % (ref 10–15)
FLUAV RNA SPEC QL NAA+PROBE: NEGATIVE
FLUBV RNA RESP QL NAA+PROBE: NEGATIVE
GLUCOSE SERPL-MCNC: 123 MG/DL (ref 70–99)
GLUCOSE UR STRIP-MCNC: NEGATIVE MG/DL
HCO3 SERPL-SCNC: 22 MMOL/L (ref 22–29)
HCT VFR BLD AUTO: 39.6 % (ref 40–53)
HGB BLD-MCNC: 13.5 G/DL (ref 13.3–17.7)
HGB UR QL STRIP: ABNORMAL
IMM GRANULOCYTES # BLD: 0 10E3/UL
IMM GRANULOCYTES NFR BLD: 0 %
KETONES UR STRIP-MCNC: NEGATIVE MG/DL
LEUKOCYTE ESTERASE UR QL STRIP: NEGATIVE
LIPASE SERPL-CCNC: 24 U/L (ref 13–60)
LVEF ECHO: NORMAL
LYMPHOCYTES # BLD AUTO: 2.1 10E3/UL (ref 0.8–5.3)
LYMPHOCYTES NFR BLD AUTO: 28 %
MAGNESIUM SERPL-MCNC: 2.1 MG/DL (ref 1.7–2.3)
MCH RBC QN AUTO: 29.7 PG (ref 26.5–33)
MCHC RBC AUTO-ENTMCNC: 34.1 G/DL (ref 31.5–36.5)
MCV RBC AUTO: 87 FL (ref 78–100)
MONOCYTES # BLD AUTO: 0.7 10E3/UL (ref 0–1.3)
MONOCYTES NFR BLD AUTO: 9 %
NEUTROPHILS # BLD AUTO: 4.2 10E3/UL (ref 1.6–8.3)
NEUTROPHILS NFR BLD AUTO: 56 %
NITRATE UR QL: NEGATIVE
NRBC # BLD AUTO: 0 10E3/UL
NRBC BLD AUTO-RTO: 0 /100
PH UR STRIP: 6.5 [PH] (ref 5–7)
PLATELET # BLD AUTO: 279 10E3/UL (ref 150–450)
POTASSIUM SERPL-SCNC: 4.3 MMOL/L (ref 3.4–5.3)
PROT SERPL-MCNC: 6.7 G/DL (ref 6.4–8.3)
PTH-INTACT SERPL-MCNC: 66 PG/ML (ref 15–65)
RBC # BLD AUTO: 4.55 10E6/UL (ref 4.4–5.9)
RBC URINE: 5 /HPF
RSV RNA SPEC NAA+PROBE: NEGATIVE
SARS-COV-2 RNA RESP QL NAA+PROBE: POSITIVE
SODIUM SERPL-SCNC: 139 MMOL/L (ref 135–145)
SP GR UR STRIP: 1.01 (ref 1–1.03)
SQUAMOUS EPITHELIAL: 2 /HPF
UROBILINOGEN UR STRIP-MCNC: NORMAL MG/DL
VIT D+METAB SERPL-MCNC: 43 NG/ML (ref 20–50)
WBC # BLD AUTO: 7.4 10E3/UL (ref 4–11)
WBC URINE: 1 /HPF

## 2025-07-10 PROCEDURE — 83970 ASSAY OF PARATHORMONE: CPT | Performed by: STUDENT IN AN ORGANIZED HEALTH CARE EDUCATION/TRAINING PROGRAM

## 2025-07-10 PROCEDURE — 83735 ASSAY OF MAGNESIUM: CPT | Performed by: STUDENT IN AN ORGANIZED HEALTH CARE EDUCATION/TRAINING PROGRAM

## 2025-07-10 PROCEDURE — 96375 TX/PRO/DX INJ NEW DRUG ADDON: CPT

## 2025-07-10 PROCEDURE — 99285 EMERGENCY DEPT VISIT HI MDM: CPT | Mod: 25

## 2025-07-10 PROCEDURE — 93306 TTE W/DOPPLER COMPLETE: CPT | Mod: 26 | Performed by: INTERNAL MEDICINE

## 2025-07-10 PROCEDURE — 85004 AUTOMATED DIFF WBC COUNT: CPT | Performed by: EMERGENCY MEDICINE

## 2025-07-10 PROCEDURE — 250N000013 HC RX MED GY IP 250 OP 250 PS 637: Performed by: EMERGENCY MEDICINE

## 2025-07-10 PROCEDURE — 36415 COLL VENOUS BLD VENIPUNCTURE: CPT | Performed by: EMERGENCY MEDICINE

## 2025-07-10 PROCEDURE — 84155 ASSAY OF PROTEIN SERUM: CPT | Performed by: EMERGENCY MEDICINE

## 2025-07-10 PROCEDURE — 250N000011 HC RX IP 250 OP 636: Performed by: EMERGENCY MEDICINE

## 2025-07-10 PROCEDURE — 99207 PR APP CREDIT; MD BILLING SHARED VISIT: CPT | Performed by: INTERNAL MEDICINE

## 2025-07-10 PROCEDURE — G0378 HOSPITAL OBSERVATION PER HR: HCPCS

## 2025-07-10 PROCEDURE — 96376 TX/PRO/DX INJ SAME DRUG ADON: CPT

## 2025-07-10 PROCEDURE — 93306 TTE W/DOPPLER COMPLETE: CPT

## 2025-07-10 PROCEDURE — 36415 COLL VENOUS BLD VENIPUNCTURE: CPT | Performed by: STUDENT IN AN ORGANIZED HEALTH CARE EDUCATION/TRAINING PROGRAM

## 2025-07-10 PROCEDURE — 250N000013 HC RX MED GY IP 250 OP 250 PS 637: Performed by: STUDENT IN AN ORGANIZED HEALTH CARE EDUCATION/TRAINING PROGRAM

## 2025-07-10 PROCEDURE — 99222 1ST HOSP IP/OBS MODERATE 55: CPT | Performed by: SURGERY

## 2025-07-10 PROCEDURE — 83690 ASSAY OF LIPASE: CPT | Performed by: EMERGENCY MEDICINE

## 2025-07-10 PROCEDURE — 71260 CT THORAX DX C+: CPT

## 2025-07-10 PROCEDURE — 94150 VITAL CAPACITY TEST: CPT

## 2025-07-10 PROCEDURE — 999N000157 HC STATISTIC RCP TIME EA 10 MIN

## 2025-07-10 PROCEDURE — 82306 VITAMIN D 25 HYDROXY: CPT | Performed by: STUDENT IN AN ORGANIZED HEALTH CARE EDUCATION/TRAINING PROGRAM

## 2025-07-10 PROCEDURE — 120N000001 HC R&B MED SURG/OB

## 2025-07-10 PROCEDURE — 99222 1ST HOSP IP/OBS MODERATE 55: CPT | Performed by: PHYSICIAN ASSISTANT

## 2025-07-10 PROCEDURE — 250N000009 HC RX 250: Performed by: EMERGENCY MEDICINE

## 2025-07-10 PROCEDURE — 81001 URINALYSIS AUTO W/SCOPE: CPT | Performed by: INTERNAL MEDICINE

## 2025-07-10 PROCEDURE — 87637 SARSCOV2&INF A&B&RSV AMP PRB: CPT | Performed by: STUDENT IN AN ORGANIZED HEALTH CARE EDUCATION/TRAINING PROGRAM

## 2025-07-10 PROCEDURE — 96374 THER/PROPH/DIAG INJ IV PUSH: CPT | Mod: 59

## 2025-07-10 PROCEDURE — 99222 1ST HOSP IP/OBS MODERATE 55: CPT | Performed by: STUDENT IN AN ORGANIZED HEALTH CARE EDUCATION/TRAINING PROGRAM

## 2025-07-10 PROCEDURE — 250N000013 HC RX MED GY IP 250 OP 250 PS 637: Performed by: INTERNAL MEDICINE

## 2025-07-10 RX ORDER — NALOXONE HYDROCHLORIDE 0.4 MG/ML
0.2 INJECTION, SOLUTION INTRAMUSCULAR; INTRAVENOUS; SUBCUTANEOUS
Status: DISCONTINUED | OUTPATIENT
Start: 2025-07-10 | End: 2025-07-12 | Stop reason: HOSPADM

## 2025-07-10 RX ORDER — CIPROFLOXACIN 500 MG/1
500 TABLET, FILM COATED ORAL 2 TIMES DAILY
Status: ON HOLD | COMMUNITY
Start: 2025-07-05 | End: 2025-07-12

## 2025-07-10 RX ORDER — IOPAMIDOL 755 MG/ML
500 INJECTION, SOLUTION INTRAVASCULAR ONCE
Status: COMPLETED | OUTPATIENT
Start: 2025-07-10 | End: 2025-07-10

## 2025-07-10 RX ORDER — LIDOCAINE 4 G/G
1 PATCH TOPICAL ONCE
Status: COMPLETED | OUTPATIENT
Start: 2025-07-10 | End: 2025-07-10

## 2025-07-10 RX ORDER — AMOXICILLIN 250 MG
2 CAPSULE ORAL 2 TIMES DAILY PRN
Status: DISCONTINUED | OUTPATIENT
Start: 2025-07-10 | End: 2025-07-12 | Stop reason: HOSPADM

## 2025-07-10 RX ORDER — NALOXONE HYDROCHLORIDE 0.4 MG/ML
0.4 INJECTION, SOLUTION INTRAMUSCULAR; INTRAVENOUS; SUBCUTANEOUS
Status: DISCONTINUED | OUTPATIENT
Start: 2025-07-10 | End: 2025-07-12 | Stop reason: HOSPADM

## 2025-07-10 RX ORDER — KETOROLAC TROMETHAMINE 15 MG/ML
15 INJECTION, SOLUTION INTRAMUSCULAR; INTRAVENOUS ONCE
Status: COMPLETED | OUTPATIENT
Start: 2025-07-10 | End: 2025-07-10

## 2025-07-10 RX ORDER — AMOXICILLIN 250 MG
1 CAPSULE ORAL 2 TIMES DAILY PRN
Status: DISCONTINUED | OUTPATIENT
Start: 2025-07-10 | End: 2025-07-12 | Stop reason: HOSPADM

## 2025-07-10 RX ORDER — NITROFURANTOIN 25; 75 MG/1; MG/1
100 CAPSULE ORAL EVERY 12 HOURS SCHEDULED
Status: DISCONTINUED | OUTPATIENT
Start: 2025-07-10 | End: 2025-07-12 | Stop reason: HOSPADM

## 2025-07-10 RX ORDER — METHOCARBAMOL 500 MG/1
250 TABLET ORAL 4 TIMES DAILY
Status: DISCONTINUED | OUTPATIENT
Start: 2025-07-10 | End: 2025-07-10

## 2025-07-10 RX ORDER — METHOCARBAMOL 500 MG/1
250 TABLET ORAL 3 TIMES DAILY
Status: DISCONTINUED | OUTPATIENT
Start: 2025-07-10 | End: 2025-07-12 | Stop reason: HOSPADM

## 2025-07-10 RX ORDER — MORPHINE SULFATE 4 MG/ML
4 INJECTION, SOLUTION INTRAMUSCULAR; INTRAVENOUS
Refills: 0 | Status: COMPLETED | OUTPATIENT
Start: 2025-07-10 | End: 2025-07-10

## 2025-07-10 RX ORDER — ACETAMINOPHEN 325 MG/1
975 TABLET ORAL EVERY 8 HOURS
Status: DISCONTINUED | OUTPATIENT
Start: 2025-07-10 | End: 2025-07-12 | Stop reason: HOSPADM

## 2025-07-10 RX ORDER — VENLAFAXINE HYDROCHLORIDE 75 MG/1
75 CAPSULE, EXTENDED RELEASE ORAL DAILY
Status: DISCONTINUED | OUTPATIENT
Start: 2025-07-10 | End: 2025-07-12 | Stop reason: HOSPADM

## 2025-07-10 RX ORDER — ATORVASTATIN CALCIUM 20 MG/1
20 TABLET, FILM COATED ORAL EVERY EVENING
Status: DISCONTINUED | OUTPATIENT
Start: 2025-07-10 | End: 2025-07-12 | Stop reason: HOSPADM

## 2025-07-10 RX ORDER — CYCLOBENZAPRINE HCL 10 MG
10 TABLET ORAL ONCE
Status: COMPLETED | OUTPATIENT
Start: 2025-07-10 | End: 2025-07-10

## 2025-07-10 RX ORDER — NIFEDIPINE 90 MG/1
90 TABLET, EXTENDED RELEASE ORAL DAILY
Status: DISCONTINUED | OUTPATIENT
Start: 2025-07-10 | End: 2025-07-12 | Stop reason: HOSPADM

## 2025-07-10 RX ORDER — LIDOCAINE 4 G/G
1 PATCH TOPICAL
Status: DISCONTINUED | OUTPATIENT
Start: 2025-07-11 | End: 2025-07-12 | Stop reason: HOSPADM

## 2025-07-10 RX ORDER — BENZONATATE 100 MG/1
100 CAPSULE ORAL 3 TIMES DAILY PRN
Status: DISCONTINUED | OUTPATIENT
Start: 2025-07-10 | End: 2025-07-12 | Stop reason: HOSPADM

## 2025-07-10 RX ORDER — GABAPENTIN 100 MG/1
100 CAPSULE ORAL 3 TIMES DAILY
Status: DISCONTINUED | OUTPATIENT
Start: 2025-07-10 | End: 2025-07-12 | Stop reason: HOSPADM

## 2025-07-10 RX ORDER — OXYCODONE HYDROCHLORIDE 5 MG/1
5 TABLET ORAL EVERY 4 HOURS PRN
Refills: 0 | Status: DISCONTINUED | OUTPATIENT
Start: 2025-07-10 | End: 2025-07-12 | Stop reason: HOSPADM

## 2025-07-10 RX ORDER — LOSARTAN POTASSIUM 100 MG/1
100 TABLET ORAL EVERY EVENING
Status: DISCONTINUED | OUTPATIENT
Start: 2025-07-10 | End: 2025-07-12 | Stop reason: HOSPADM

## 2025-07-10 RX ORDER — METHOCARBAMOL 500 MG/1
500 TABLET, FILM COATED ORAL EVERY 6 HOURS PRN
Status: DISCONTINUED | OUTPATIENT
Start: 2025-07-10 | End: 2025-07-10

## 2025-07-10 RX ORDER — SPIRONOLACTONE 25 MG
12.5 TABLET ORAL DAILY
Status: DISCONTINUED | OUTPATIENT
Start: 2025-07-10 | End: 2025-07-12 | Stop reason: HOSPADM

## 2025-07-10 RX ADMIN — KETOROLAC TROMETHAMINE 15 MG: 15 INJECTION, SOLUTION INTRAMUSCULAR; INTRAVENOUS at 03:27

## 2025-07-10 RX ADMIN — VENLAFAXINE HYDROCHLORIDE 75 MG: 75 CAPSULE, EXTENDED RELEASE ORAL at 11:47

## 2025-07-10 RX ADMIN — MORPHINE SULFATE 4 MG: 4 INJECTION, SOLUTION INTRAMUSCULAR; INTRAVENOUS at 04:36

## 2025-07-10 RX ADMIN — NITROFURANTOIN MONOHYDRATE/MACROCRYSTALS 100 MG: 75; 25 CAPSULE ORAL at 09:34

## 2025-07-10 RX ADMIN — GABAPENTIN 100 MG: 100 CAPSULE ORAL at 16:11

## 2025-07-10 RX ADMIN — ACETAMINOPHEN 975 MG: 325 TABLET ORAL at 11:46

## 2025-07-10 RX ADMIN — LOSARTAN POTASSIUM 100 MG: 100 TABLET, FILM COATED ORAL at 20:42

## 2025-07-10 RX ADMIN — NITROFURANTOIN MONOHYDRATE/MACROCRYSTALS 100 MG: 75; 25 CAPSULE ORAL at 20:41

## 2025-07-10 RX ADMIN — GABAPENTIN 100 MG: 100 CAPSULE ORAL at 20:41

## 2025-07-10 RX ADMIN — ACETAMINOPHEN 975 MG: 325 TABLET ORAL at 20:40

## 2025-07-10 RX ADMIN — LIDOCAINE 1 PATCH: 4 PATCH TOPICAL at 03:27

## 2025-07-10 RX ADMIN — SODIUM CHLORIDE 65 ML: 9 INJECTION, SOLUTION INTRAVENOUS at 04:25

## 2025-07-10 RX ADMIN — ATORVASTATIN CALCIUM 20 MG: 20 TABLET, FILM COATED ORAL at 20:41

## 2025-07-10 RX ADMIN — MORPHINE SULFATE 4 MG: 4 INJECTION, SOLUTION INTRAMUSCULAR; INTRAVENOUS at 03:27

## 2025-07-10 RX ADMIN — GABAPENTIN 100 MG: 100 CAPSULE ORAL at 09:34

## 2025-07-10 RX ADMIN — IOPAMIDOL 100 ML: 755 INJECTION, SOLUTION INTRAVENOUS at 04:22

## 2025-07-10 RX ADMIN — METHOCARBAMOL 250 MG: 500 TABLET ORAL at 09:34

## 2025-07-10 RX ADMIN — NIFEDIPINE 90 MG: 90 TABLET, EXTENDED RELEASE ORAL at 11:47

## 2025-07-10 RX ADMIN — CYCLOBENZAPRINE 10 MG: 10 TABLET, FILM COATED ORAL at 03:27

## 2025-07-10 RX ADMIN — METHOCARBAMOL 250 MG: 500 TABLET ORAL at 20:41

## 2025-07-10 RX ADMIN — METHOCARBAMOL 250 MG: 500 TABLET ORAL at 16:11

## 2025-07-10 RX ADMIN — MORPHINE SULFATE 4 MG: 4 INJECTION, SOLUTION INTRAMUSCULAR; INTRAVENOUS at 06:17

## 2025-07-10 RX ADMIN — SPIRONOLACTONE 12.5 MG: 25 TABLET ORAL at 11:45

## 2025-07-10 ASSESSMENT — COLUMBIA-SUICIDE SEVERITY RATING SCALE - C-SSRS
1. IN THE PAST MONTH, HAVE YOU WISHED YOU WERE DEAD OR WISHED YOU COULD GO TO SLEEP AND NOT WAKE UP?: NO
6. HAVE YOU EVER DONE ANYTHING, STARTED TO DO ANYTHING, OR PREPARED TO DO ANYTHING TO END YOUR LIFE?: NO
2. HAVE YOU ACTUALLY HAD ANY THOUGHTS OF KILLING YOURSELF IN THE PAST MONTH?: NO

## 2025-07-10 ASSESSMENT — ACTIVITIES OF DAILY LIVING (ADL)
ADLS_ACUITY_SCORE: 51
ADLS_ACUITY_SCORE: 43
ADLS_ACUITY_SCORE: 44
ADLS_ACUITY_SCORE: 43
ADLS_ACUITY_SCORE: 44
ADLS_ACUITY_SCORE: 51
ADLS_ACUITY_SCORE: 44
ADLS_ACUITY_SCORE: 43
ADLS_ACUITY_SCORE: 51
ADLS_ACUITY_SCORE: 43
ADLS_ACUITY_SCORE: 51
ADLS_ACUITY_SCORE: 43
ADLS_ACUITY_SCORE: 43
ADLS_ACUITY_SCORE: 51
ADLS_ACUITY_SCORE: 43
ADLS_ACUITY_SCORE: 51
ADLS_ACUITY_SCORE: 44
ADLS_ACUITY_SCORE: 43
ADLS_ACUITY_SCORE: 43
ADLS_ACUITY_SCORE: 44

## 2025-07-10 NOTE — PLAN OF CARE
"Goal Outcome Evaluation:      Plan of Care Reviewed With: patient    Overall Patient Progress: improvingOverall Patient Progress: improving    Outcome Evaluation: A&Ox4. VSS. Assist x 1 with gait belt. O2 Sat 95% on room air. LS clear. Denies nausea or SOB. C/o lower back and right rib pain. Scheduled Robaxin, Tylenol and gabapentin given. Had some pain relief. Voided. PIV SL. Ab. Macrobid.      Problem: Adult Inpatient Plan of Care  Goal: Plan of Care Review  Description: The Plan of Care Review/Shift note should be completed every shift.  The Outcome Evaluation is a brief statement about your assessment that the patient is improving, declining, or no change.  This information will be displayed automatically on your shift  note.  Outcome: Progressing  Flowsheets (Taken 7/10/2025 1626)  Outcome Evaluation: A&Ox4. VSS. Assist x 1 with gait belt. O2 Sat 95% on room air. LS clear. Denies nausea or SOB. C/o lower back and right rib pain. Scheduled Robaxin, Tylenol and gabapentin given. Had some pain relief. Voided. PIV SL. Ab. Macrobid.  Plan of Care Reviewed With: patient  Overall Patient Progress: improving  Goal: Patient-Specific Goal (Individualized)  Description: You can add care plan individualizations to a care plan. Examples of Individualization might be:  \"Parent requests to be called daily at 9am for status\", \"I have a hard time hearing out of my right ear\", or \"Do not touch me to wake me up as it startles  me\".  Outcome: Progressing  Goal: Absence of Hospital-Acquired Illness or Injury  Outcome: Progressing  Intervention: Identify and Manage Fall Risk  Recent Flowsheet Documentation  Taken 7/10/2025 0932 by Loan Mueller, RN  Safety Promotion/Fall Prevention:   activity supervised   clutter free environment maintained   safety round/check completed  Intervention: Prevent Skin Injury  Recent Flowsheet Documentation  Taken 7/10/2025 0900 by Loan Mueller, RN  Body Position: position changed " independently  Intervention: Prevent and Manage VTE (Venous Thromboembolism) Risk  Recent Flowsheet Documentation  Taken 7/10/2025 0932 by Loan Mueller RN  VTE Prevention/Management: SCDs off (sequential compression devices)  Intervention: Prevent Infection  Recent Flowsheet Documentation  Taken 7/10/2025 0932 by Loan Mueller RN  Infection Prevention:   personal protective equipment utilized   single patient room provided   hand hygiene promoted  Goal: Optimal Comfort and Wellbeing  Outcome: Progressing  Intervention: Monitor Pain and Promote Comfort  Recent Flowsheet Documentation  Taken 7/10/2025 1146 by Loan Mueller RN  Pain Management Interventions: repositioned  Taken 7/10/2025 0900 by Loan Mueller RN  Pain Management Interventions: medication (see MAR)  Intervention: Provide Person-Centered Care  Recent Flowsheet Documentation  Taken 7/10/2025 0932 by Loan Mueller RN  Trust Relationship/Rapport: care explained  Goal: Readiness for Transition of Care  Outcome: Progressing  Intervention: Mutually Develop Transition Plan  Recent Flowsheet Documentation  Taken 7/10/2025 0900 by Loan Mueller RN  Equipment Currently Used at Home: cane, straight

## 2025-07-10 NOTE — CONSULTS
[facility]    Neurosurgery Consultation     Date of Admission:  7/10/2025  Date of Consult (When I saw the patient): 07/10/25    Assessment & Plan   Ajay Bowden is a 79 year old male who was admitted on 7/10/2025. Ajay Bowden is a 79 year old male presented after a fall early this AM after dizziness episode and imaging evidence of L1 right TP fracture.    Patient notes he woke up this AM and had acute onset of dizziness causing him to fall. Denies loss of consciousness or head injury. Admits to low back pain. Denies radicular symptoms, paresthesias, weakness, bowel/bladder changes. Was found to be +COVID.    Narrative & Impression   EXAM: CT CHEST/ABDOMEN/PELVIS W CONTRAST  LOCATION: [facility]  DATE: 7/10/2025                                                     IMPRESSION:  1.  Healing fractures of the right anterior 3rd - 6th ribs.   2.  Mildly displaced acute fracture of the right posterior 10th rib. Moderately displaced acute fractures of the right posterior 11th and 12th ribs. Minimally displaced acute fracture of the right transverse process of L1.  3.  No significant pulmonary contusion, or evidence of pneumothorax.  4.  No other acute traumatic abnormality to the chest, abdomen, or pelvis.  5.  Prior ventral abdominal wall and left inguinal hernia repair. Within the left inguinal canal there is a 5.3 x 4.4 cm rim-enhancing fluid collection, which could represent a post operative seroma or liquefied hematoma, though an abscess could also   have this appearance.  6.  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      PLAN:   -light activity  -TP fracture will heal on its own  -does not require bracing or follow up     Our team will sign off at this time    I have discussed the following assessment and plan with Dr. Valladares who is in agreement with the initial plan and will follow up with  further consultation recommendations.    Natasha Gregorio PA-C  [facility]  [address]  [address] [place] [id number]  Tel [phone]  Fax [phone]  Text page via [employer] Paging/Directory      Code Status    Full Code    Reason for Consult   Reason for consult: I was asked by Dr. Almazan to evaluate this patient for L1 tp fracture.    Primary Care Physician   [facility]    Chief Complaint   Fall     History is obtained from the patient, electronic health record, and emergency department physician    History of Present Illness   Ajay Bowden is a 79 year old male presented after a fall early this AM after dizziness episode and imaging evidence of L1 right TP fracture.    Patient notes he woke up this AM and had acute onset of dizziness causing him to fall. Denies loss of consciousness or head injury. Admits to low back pain. Denies radicular symptoms, paresthesias, weakness, bowel/bladder changes. Was found to be +COVID.    Narrative & Impression   EXAM: CT CHEST/ABDOMEN/PELVIS W CONTRAST  LOCATION: [facility]  DATE: 7/10/2025                                                     IMPRESSION:  1.  Healing fractures of the right anterior 3rd - 6th ribs.   2.  Mildly displaced acute fracture of the right posterior 10th rib. Moderately displaced acute fractures of the right posterior 11th and 12th ribs. Minimally displaced acute fracture of the right transverse process of L1.  3.  No significant pulmonary contusion, or evidence of pneumothorax.  4.  No other acute traumatic abnormality to the chest, abdomen, or pelvis.  5.  Prior ventral abdominal wall and left inguinal hernia repair. Within the left inguinal canal there is a 5.3 x 4.4 cm rim-enhancing fluid collection, which could represent a post operative seroma or liquefied hematoma, though an abscess could also   have this appearance.  6.  Redemonstration of a large left extensor musculature lipoma  of the the proximal aspect of the left lower extremity.        Past Medical History   I have reviewed this patient's medical history and updated it with pertinent information if needed.   No past medical history on file.    Past Surgical History   I have reviewed this patient's surgical history and updated it with pertinent information if needed.  No past surgical history on file.    Prior to Admission Medications   Prior to Admission Medications   Prescriptions Last Dose Informant Patient Reported? Taking?   NIFEdipine ER OSMOTIC (PROCARDIA XL) 90 MG 24 hr tablet 7/9/2025 Morning  Yes Yes   Sig: Take 90 mg by mouth daily.   acetaminophen (TYLENOL) 500 MG tablet   Yes Yes   Sig: Take 500 mg by mouth every 6 hours as needed for mild pain.   atorvastatin (LIPITOR) 20 MG tablet 7/9/2025 Bedtime  Yes Yes   Sig: Take 20 mg by mouth every evening.   ciprofloxacin (CIPRO) 500 MG tablet 7/9/2025 Evening  Yes Yes   Sig: Take 500 mg by mouth 2 times daily.   labetalol (NORMODYNE) 200 MG tablet 7/9/2025 Evening  Yes Yes   Sig: Take 200 mg by mouth 2 times daily.   olmesartan (BENICAR) 40 MG tablet 7/9/2025 Evening  Yes Yes   Sig: Take 40 mg by mouth every evening.   polyethylene glycol (MIRALAX) 17 g packet   Yes Yes   Sig: Take 1 packet by mouth daily as needed for constipation.   spironolactone (ALDACTONE) 12.5 mg TABS half-tab 7/9/2025 Morning  Yes Yes   Sig: Take 12.5 mg by mouth daily.   venlafaxine (EFFEXOR XR) 75 MG 24 hr capsule 7/9/2025 Morning  Yes Yes   Sig: Take 75 mg by mouth daily.      Facility-Administered Medications: None     Allergies   Allergies   Allergen Reactions    Hydrochlorothiazide Other (See Comments)     Stopped 7/2012 due to urinary freq and urgency       Social History   I have reviewed this patient's social history and updated it with pertinent information if needed. Ajay Bowden      Family History   I have reviewed this patient's family history and updated it with pertinent information if  "needed.   No family history on file.    Review of Systems    ROS: 10 point ROS neg other than the symptoms noted above in the HPI.      Physical Exam   Temp: 97.9  F (36.6  C) Temp src: Oral BP: (!) 169/101 Pulse: 58   Resp: 18 SpO2: 92 % O2 Device: None (Room air)    Vital Signs with Ranges  Temp:  [97.8  F (36.6  C)-97.9  F (36.6  C)] 97.9  F (36.6  C)  Pulse:  [57-68] 58  Resp:  [18-20] 18  BP: (159-184)/() 169/101  SpO2:  [92 %-98 %] 92 %  226 lbs 10.13 oz     , Blood pressure (!) 169/101, pulse 58, temperature 97.9  F (36.6  C), temperature source Oral, resp. rate 18, height 1.854 m (6' 1\"), weight 102.8 kg (226 lb 10.1 oz), SpO2 92%.  226 lbs 10.13 oz      Awake, alert, appropriate   5/5 motor strength BLE in all muscle groups   Admits to back pain with repositioning in bed and deferred rolling over for tenderness exam  Sensation intact to light touch BLE  Negative clonus   Negative SLR BL      Data   All new lab and imaging data was personally reviewed by me.     Study Result    Narrative & Impression   EXAM: CT CHEST/ABDOMEN/PELVIS W CONTRAST  LOCATION: [facility]  DATE: 7/10/2025     INDICATION: right flank and posterior right chest wall pain and tenderness following a fall  COMPARISON: Chest/right rib radiographs 05/21/2025, CT abdomen/pelvis without contrast 04/02/2025  TECHNIQUE: CT scan of the chest, abdomen, and pelvis was performed following injection of IV contrast. Multiplanar reformats were obtained. Dose reduction techniques were used.   CONTRAST: 100mL Isovue 370     FINDINGS:   LUNGS AND PLEURA: No significant pulmonary contusion, or evidence of pneumothorax.     MEDIASTINUM/AXILLAE: No lymphadenopathy or pericardial effusion. No mediastinal hemorrhage. Heart is mildly enlarged. Trace pericardial effusion.     CORONARY ARTERY CALCIFICATION: Moderate.     HEPATOBILIARY: Left hepatic lobe is somewhat atrophied. Unremarkable gallbladder.     PANCREAS: Somewhat atrophic " pancreas, otherwise unremarkable.     SPLEEN: Normal.     ADRENAL GLANDS: Left adrenal myelolipoma. Unremarkable right adrenal gland.      KIDNEYS/BLADDER: Renal cysts which require no dedicated follow-up. Small nonobstructing calyceal calculi. No ureterolithiasis or hydronephrosis. Urinary bladder is unremarkable.     BOWEL: Colonic diverticulosis. No bowel obstruction.     LYMPH NODES: Normal.     VASCULATURE: Atherosclerotic calcifications of the aortoiliac vessels without evidence of aneurysmal dilatation.     PELVIC ORGANS: Prominent prostate with central calcifications.     MUSCULOSKELETAL: Prior ventral abdominal wall and left inguinal hernia repair. Within the left inguinal canal there is a 5.3 x 4.4 cm rim-enhancing fluid collection (series 3 image 322), which could represent a post operative seroma or liquefied   hematoma, though an abscess could also have this appearance. 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 Healing fractures of the right anterior 3rd - 6th ribs. Mildly displaced   acute fracture of the right posterior 10th rib. Moderately displaced acute fractures of the right posterior 11th and 12th ribs. Minimally displaced acute fracture of the right transverse process of L1. Degenerative changes of the lumbosacral spine.                                                                       IMPRESSION:  1.  Healing fractures of the right anterior 3rd - 6th ribs.   2.  Mildly displaced acute fracture of the right posterior 10th rib. Moderately displaced acute fractures of the right posterior 11th and 12th ribs. Minimally displaced acute fracture of the right transverse process of L1.  3.  No significant pulmonary contusion, or evidence of pneumothorax.  4.  No other acute traumatic abnormality to the chest, abdomen, or pelvis.  5.  Prior ventral abdominal wall and left inguinal hernia repair. Within the left inguinal canal  "there is a 5.3 x 4.4 cm rim-enhancing fluid collection, which could represent a post operative seroma or liquefied hematoma, though an abscess could also   have this appearance.  6.  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      CBC RESULTS:   Recent Labs   Lab Test 07/10/25  0326   WBC 7.4   RBC 4.55   HGB 13.5   HCT 39.6*   MCV 87   MCH 29.7   MCHC 34.1   RDW 14.0        Basic Metabolic Panel:  Lab Results   Component Value Date     07/10/2025      Lab Results   Component Value Date    POTASSIUM 4.3 07/10/2025     Lab Results   Component Value Date    CHLORIDE 105 07/10/2025     Lab Results   Component Value Date    YASIR 9.5 07/10/2025     Lab Results   Component Value Date    CO2 22 07/10/2025     Lab Results   Component Value Date    BUN 15.9 07/10/2025     Lab Results   Component Value Date    CR 0.94 07/10/2025     Lab Results   Component Value Date     07/10/2025     INR:  No results found for: \"INR\"      "

## 2025-07-10 NOTE — ED PROVIDER NOTES
Emergency Department Note      History of Present Illness     Chief Complaint   Fall    HPI   Ajay Bowden is a 79 year old male with a history as noted below who presents to the emergency department for a fall. The patient states that he has a hx of vertigo and has been experiencing intermittent episodes of ongoing dizziness. He reports that he has also been experiencing ongoing URI symptoms and just prior to arrival, he got up to get some cough drops and began experiencing an onset of this dizziness and fell in his kitchen. He denies loss of consciousness or head trauma. He adds that since his fall, he has been experiencing right-sided rib pain near his right flank. EMS gave 1 mg of Dilaudid en route with slight improvement of his pain. No pain in his upper or lower extremities. No headache. No chest pain or abdominal pain. No nausea, vomiting, diarrhea. No midline neck pain or back pain. No blood thinner use.    Independent Historian   None    Review of External Notes   Reviewed Urgent Care note from 07/05/25. Diagnosed with viral URI.    Past Medical History     Medical History and Problem List   Anxiety & depression  Benign prostatic hyperplasia  Hernia inguinal L  Hyperlipidemia  Hypertension essential  Obstructive sleep apnea  Sensorineural hearing loss B/L  Vertigo     Medications   atorvastatin (LIPITOR) 20 MG tablet  labetalol (NORMODYNE) 200 MG tablet  NIFEdipine ER OSMOTIC (PROCARDIA XL) 90 MG 24 hr tablet  olmesartan (BENICAR) 40 MG tablet  polyethylene glycol (MIRALAX) 17 g packet  spironolactone (ALDACTONE) 12.5 mg TABS half-tab  venlafaxine (EFFEXOR XR) 75 MG 24 hr capsule    Surgical History   Herniorrhaphy inguinal L  Herniorrhaphy umbilical  Lithotripsy  Rotator cuff repair R  Tonsil & adenoidectomy  TURP  Vasectomy    Physical Exam     Patient Vitals for the past 24 hrs:   BP Temp Temp src Pulse Resp SpO2 Height Weight   07/10/25 0530 (!) 184/98 -- -- 57 -- 94 % -- --   07/10/25 0415 -- -- --  "-- -- 93 % -- --   07/10/25 0345 -- -- -- -- -- 94 % -- --   07/10/25 0331 -- -- -- -- -- 95 % -- --   07/10/25 0330 (!) 171/102 -- -- 68 -- 96 % -- --   07/10/25 0319 -- -- -- -- 20 -- -- --   07/10/25 0317 -- -- -- -- -- -- 1.854 m (6' 1\") 102.1 kg (225 lb)   07/10/25 0316 -- 97.8  F (36.6  C) Oral -- -- -- -- --   07/10/25 0315 -- -- -- -- -- 98 % -- --   07/10/25 0314 -- -- -- -- -- 98 % -- --   07/10/25 0313 -- -- -- -- -- 97 % -- --   07/10/25 0312 (!) 169/102 -- -- 58 -- -- -- --     Physical Exam  General: Patient is alert, awake and interactive when I enter the room  Head: The scalp, face, and head appear normal. Atraumatic.   Eyes: The pupils are equal, round, and reactive to light. Conjunctivae and sclerae are normal  ENT: No hemotympanum or signs basilar skull fracture. The oropharynx is normal without erythema. No tenderness to palpation of the face, nose or jaw.   Neck: Normal range of motion. No cervical midline tenderness.   CV: Regular rate.   Resp: Lungs are clear without wheezes or rales. No distress. No crepitance.   GI: Abdomen is soft, no rigidity, guarding, or rebound. No contusion. No distension. No tenderness to palpation in any quadrant.     MS: Significant tenderness to right lower chest wall.  Normal tone. Joints grossly normal without effusions. No asymmetric leg swelling, calf or thigh tenderness. Normal motor assessment of all extremities. PROM performed of all major joints without pain . No C/T/L tenderness in midline  Skin: No rash or lesions noted. Normal capillary refill noted  Neuro:  GCS 15.  CN\"s II-XII intact. Speech is normal and fluent. Face is symmetric. Strength is normal and symmetric.   Psych: Normal affect.  Appropriate interactions.    Diagnostics     Lab Results   Labs Ordered and Resulted from Time of ED Arrival to Time of ED Departure   COMPREHENSIVE METABOLIC PANEL - Abnormal       Result Value    Sodium 139      Potassium 4.3      Carbon Dioxide (CO2) 22      Anion " Gap 12      Urea Nitrogen 15.9      Creatinine 0.94      GFR Estimate 83      Calcium 9.5      Chloride 105      Glucose 123 (*)     Alkaline Phosphatase 66      AST 27      ALT 29      Protein Total 6.7      Albumin 4.2      Bilirubin Total 0.4     CBC WITH PLATELETS AND DIFFERENTIAL - Abnormal    WBC Count 7.4      RBC Count 4.55      Hemoglobin 13.5      Hematocrit 39.6 (*)     MCV 87      MCH 29.7      MCHC 34.1      RDW 14.0      Platelet Count 279      % Neutrophils 56      % Lymphocytes 28      % Monocytes 9      % Eosinophils 5      % Basophils 1      % Immature Granulocytes 0      NRBCs per 100 WBC 0      Absolute Neutrophils 4.2      Absolute Lymphocytes 2.1      Absolute Monocytes 0.7      Absolute Eosinophils 0.4      Absolute Basophils 0.0      Absolute Immature Granulocytes 0.0      Absolute NRBCs 0.0     LIPASE - Normal    Lipase 24       Imaging   CT Chest/Abdomen/Pelvis w Contrast   Final Result   IMPRESSION:   1.  Healing fractures of the right anterior 3rd - 6th ribs.    2.  Mildly displaced acute fracture of the right posterior 10th rib. Moderately displaced acute fractures of the right posterior 11th and 12th ribs. Minimally displaced acute fracture of the right transverse process of L1.   3.  No significant pulmonary contusion, or evidence of pneumothorax.   4.  No other acute traumatic abnormality to the chest, abdomen, or pelvis.   5.  Prior ventral abdominal wall and left inguinal hernia repair. Within the left inguinal canal there is a 5.3 x 4.4 cm rim-enhancing fluid collection, which could represent a post operative seroma or liquefied hematoma, though an abscess could also    have this appearance.   6.  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         Independent Interpretation   None    ED Course      Medications Administered   Medications   Lidocaine (LIDOCARE) 4 % Patch 1 patch (1 patch Transdermal $Patch/Med  Applied 7/10/25 0327)   morphine (PF) injection 4 mg (4 mg Intravenous $Given 7/10/25 0436)   ketorolac (TORADOL) injection 15 mg (15 mg Intravenous $Given 7/10/25 0327)   cyclobenzaprine (FLEXERIL) tablet 10 mg (10 mg Oral $Given 7/10/25 0327)   sodium chloride 0.9 % bag for CT scan flush (65 mLs Intravenous $Given 7/10/25 0425)   iopamidol (ISOVUE-370) solution 500 mL (100 mLs Intravenous $Given 7/10/25 0422)     ED Course   ED Course as of 07/10/25 0551   Thu Jul 10, 2025   0318 I obtained history and examined the patient as noted above.        Additional Documentation  None    Medical Decision Making / Diagnosis     CMS Diagnoses: None    MIPS   None    MDM   Ajay Bowden is a 79 year old male who presents emergency department after a fall after experiencing some vertigo-like dizziness at home.  On initial evaluation he appears quite uncomfortable but is otherwise hemodynamically stable.  On physical exam he has tenderness along the right chest wall and flank.  CT scan was obtained which shows multiple rib fractures at 1011 and 12.  Also has an L1 transverse process fracture.  Patient required IV pain medications in the emergency department and has concerns about controlling his pain at home.  Will be admitted for further pain control.    Disposition   The patient was admitted to the hospital.     Diagnosis     ICD-10-CM    1. Closed fracture of multiple ribs of right side, initial encounter  S22.41XA       2. L1 transverse process fracture  S32.018A              Scribe Disclosure:  I, Saurabh Graham, am serving as a scribe at 3:23 AM on 7/10/2025 to document services personally performed by Iker Almazan MD based on my observations and the provider's statements to me.        Iker Almazan MD  07/10/25 [id number]

## 2025-07-10 NOTE — PLAN OF CARE
INPATIENT NOTE: CARES PROVIDED FROM [id number]  Diagnosis:  Fall; Rib Fx, COVID +  Temp: 97.9  F (36.6  C) Temp src: Oral BP: (!) 124/92 Pulse: 70   Resp: 16 SpO2: 96 % O2 Device: None (Room air)      Labs: COVID +, ECHO completed.     Pt is Aox4, VSS on RA. Denies SOB/Nausea/Numbness/tingling. Up SBA, baseline used walker at times. Scattered bruising from fall. Using I.S. Voiding. On Tele: SR: 80s. Able to make needs known. Covid precaution maintained.     Pain: Scheduled tylenol, gabapentin, robaxin. Informed about PRN Oxycodone; pt is hesitant to take due to constipation. Educated on PRN senna available to prevent constipation. Icing and pillow support offered.     Lines: PIV is SL    Plan: Monitor labs, pain management, has consults placed per Rib Fx protocol.     Plan of Care Reviewed With: patient  Overall Patient Progress: improving  Outcome Evaluation: AOx4, VSS on RA. Rib fx pain, icing offered. REview PRNs, hesitant of Oxycodone due to consitipated informa bout PRN senna. Up SBA, IS use, PIV is SL.    Problem: Orthopaedic Fracture  Goal: Absence of Bleeding  7/10/2025 1802 by Emerald Ma RN  Outcome: Progressing  Goal: Bowel Elimination  7/10/2025 1802 by Emerald Ma RN  Outcome: Progressing  Goal: Absence of Embolism Signs and Symptoms  7/10/2025 1802 by Emerald Ma RN  Outcome: Progressing  Goal: Fracture Stability  7/10/2025 1802 by Emerald Ma RN  Outcome: Progressing  Goal: Optimal Functional Ability  7/10/2025 1802 by Emerald Ma RN  Outcome: Progressin  Intervention: Optimize Functional Ability  Recent Flowsheet Documentation  Taken 7/10/2025 1645 by Emerald Ma, RN  Activity Management:   activity adjusted per tolerance   ambulated to bathroom   back to bed  Goal: Absence of Infection Signs and Symptoms  7/10/2025 1802 by Emerald Ma, RN  Outcome:  Progressing  Intervention: Prevent or Manage Infection  Recent Flowsheet Documentation  Taken 7/10/2025 1636 by Emerald Ma, RN  Isolation Precautions: special precautions initiated  Goal: Effective Tissue Perfusion  7/10/2025 1802 by Emerald Ma, RN  Outcome: Progressing  Goal: Optimal Pain Control and Function  7/10/2025 1802 by Emerald Ma, RN  Outcome: Progressing  Goal: Effective Oxygenation and Ventilation  7/10/2025 1802 by Emerald Ma, RN  Outcome: Progressing  Intervention: Promote Airway Secretion Clearance  Recent Flowsheet Documentation  Taken 7/10/2025 1645 by Emerald Ma, RN  Activity Management:   activity adjusted per tolerance   ambulated to bathroom   back to bed

## 2025-07-10 NOTE — ED NOTES
[facility]  ED Nurse Handoff Report    ED Chief complaint: Fall  . ED Diagnosis:   Final diagnoses:   None       Allergies:   Allergies   Allergen Reactions    Hydrochlorothiazide Other (See Comments)     Stopped 7/2012 due to urinary freq and urgency       Code Status: Full Code    Activity level - Baseline/Home:  independent.  Activity Level - Current:   in bed.   Lift room needed: No.   Bariatric: No   Needed: No   Isolation: No.   Infection: Not Applicable.     Respiratory status: Room air    Vital Signs (within 30 minutes):   Vitals:    07/10/25 0330 07/10/25 0331 07/10/25 0345 07/10/25 0415   BP: (!) 171/102      Pulse: 68      Resp:       Temp:       TempSrc:       SpO2: 96% 95% 94% 93%   Weight:       Height:           Cardiac Rhythm:  ,      Pain level:    Patient confused: No.   Patient Falls Risk: bed/chair alarm on.   Elimination Status: Has voided     Patient Report - Initial Complaint: fall.   Focused Assessment: Pt is a 79 year old male with a history as noted below who presents to the emergency department for a fall. The patient states that he has a hx of vertigo and has been experiencing intermittent episodes of ongoing dizziness. He reports that he has also been experiencing ongoing URI symptoms and just prior to arrival, he got up to get some cough drops and began experiencing an onset of this dizziness and fell in his kitchen. He denies loss of consciousness or head trauma. He adds that since his fall, he has been experiencing right-sided rib pain near his right flank. EMS gave 1 mg of Dilaudid en route with slight improvement of his pain. No pain in his upper or lower extremities. No headache. No chest pain or abdominal pain. No nausea, vomiting, diarrhea. No midline neck pain or back pain. No blood thinner use.      Abnormal Results:   Labs Ordered and Resulted from Time of ED Arrival to Time of ED Departure   COMPREHENSIVE METABOLIC PANEL - Abnormal        Result Value    Sodium 139      Potassium 4.3      Carbon Dioxide (CO2) 22      Anion Gap 12      Urea Nitrogen 15.9      Creatinine 0.94      GFR Estimate 83      Calcium 9.5      Chloride 105      Glucose 123 (*)     Alkaline Phosphatase 66      AST 27      ALT 29      Protein Total 6.7      Albumin 4.2      Bilirubin Total 0.4     CBC WITH PLATELETS AND DIFFERENTIAL - Abnormal    WBC Count 7.4      RBC Count 4.55      Hemoglobin 13.5      Hematocrit 39.6 (*)     MCV 87      MCH 29.7      MCHC 34.1      RDW 14.0      Platelet Count 279      % Neutrophils 56      % Lymphocytes 28      % Monocytes 9      % Eosinophils 5      % Basophils 1      % Immature Granulocytes 0      NRBCs per 100 WBC 0      Absolute Neutrophils 4.2      Absolute Lymphocytes 2.1      Absolute Monocytes 0.7      Absolute Eosinophils 0.4      Absolute Basophils 0.0      Absolute Immature Granulocytes 0.0      Absolute NRBCs 0.0     LIPASE - Normal    Lipase 24          CT Chest/Abdomen/Pelvis w Contrast   Final Result   IMPRESSION:   1.  Healing fractures of the right anterior 3rd - 6th ribs.    2.  Mildly displaced acute fracture of the right posterior 10th rib. Moderately displaced acute fractures of the right posterior 11th and 12th ribs. Minimally displaced acute fracture of the right transverse process of L1.   3.  No significant pulmonary contusion, or evidence of pneumothorax.   4.  No other acute traumatic abnormality to the chest, abdomen, or pelvis.   5.  Prior ventral abdominal wall and left inguinal hernia repair. Within the left inguinal canal there is a 5.3 x 4.4 cm rim-enhancing fluid collection, which could represent a post operative seroma or liquefied hematoma, though an abscess could also    have this appearance.   6.  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           Treatments provided: see MAR, notes, and flowsheets  Family Comments: wife at  bedside  OBS brochure/video discussed/provided to patient:  Yes  ED Medications:   Medications   Lidocaine (LIDOCARE) 4 % Patch 1 patch (1 patch Transdermal $Patch/Med Applied 7/10/25 0327)   morphine (PF) injection 4 mg (4 mg Intravenous $Given 7/10/25 0436)   ketorolac (TORADOL) injection 15 mg (15 mg Intravenous $Given 7/10/25 0327)   cyclobenzaprine (FLEXERIL) tablet 10 mg (10 mg Oral $Given 7/10/25 0327)   sodium chloride 0.9 % bag for CT scan flush (65 mLs Intravenous $Given 7/10/25 0425)   iopamidol (ISOVUE-370) solution 500 mL (100 mLs Intravenous $Given 7/10/25 0422)       Drips infusing:  No  For the majority of the shift this patient was Green.   Interventions performed were n/a.    Sepsis treatment initiated: No    Cares/treatment/interventions/medications to be completed following ED care: follow orders    ED Nurse Name: Mee Prasad RN  5:25 AM    RECEIVING UNIT ED HANDOFF REVIEW    Above ED Nurse Handoff Report was reviewed: Yes  Reviewed by: Beth Buck RN on July 10, 2025 at 6:10 AM   I [place] called the ED to inform them the note was read: Yes

## 2025-07-10 NOTE — ED TRIAGE NOTES
Pt BIBA from home after falling in his kitchen and hitting his back on the edge of a granite counter top. Patient states he got up to grab some cough drops s/t an ongoing cough and he coughed so hard he got dizzy and that is what caused the fall. Patient does not take blood thinners, no LOC, GCS 15, denies CP or SOB.      Triage Assessment (Adult)       Row Name 07/10/25 0313          Triage Assessment    Airway WDL WDL        Respiratory WDL    Respiratory WDL WDL        Skin Circulation/Temperature WDL    Skin Circulation/Temperature WDL WDL        Cardiac WDL    Cardiac WDL WDL        Peripheral/Neurovascular WDL    Peripheral Neurovascular WDL WDL        Cognitive/Neuro/Behavioral WDL    Cognitive/Neuro/Behavioral WDL WDL

## 2025-07-10 NOTE — PLAN OF CARE
ROOM # 440    Living Situation (if not independent, order SW consult): [facility] living    Facility name:    : Joselin (wife)    Activity level at baseline: Ind  Activity level on admit: Bed rest    Who will be transporting you at discharge: Joselin    Patient registered to observation; given Patient Bill of Rights; given the opportunity to ask questions about observation status and their plan of care.  Patient has been oriented to the observation room, bathroom and call light is in place.    Discussed discharge goals and expectations with patient/family.

## 2025-07-10 NOTE — H&P
[facility]    History and Physical - Hospitalist Service       Date of Admission:  7/10/2025    Assessment & Plan      Ajay Bowden is a 79 year old male with past medical history significant for hypertension, hyperlipidemia, BPH, GOMEZ, recent hernia repair, recent hospitalization with UTI, recurrent episode of UTI and currently on ciprofloxacin who presented to the ER after sustaining a fall at his home and was found to have multiple right-sided rib fractures and minimally displaced acute fracture of right transverse process of L1.  Admitted to the observation for pain control.       Fall-likely secondary to dizziness/presyncope  Patient reported that lately he was feeling under the weather.  He was having cough so he decided to go to the kitchen to get the cough drops.  While standing near the kitchen counter he felt lightheaded so he fell on the marble kitchen countertop hitting the right side of his chest and abdomen.  He denied losing consciousness or hitting his head.  He has history of hypertension and is on multiple antihypertensive medication.  Based on the description of his fall it appears more like vasovagal in the setting of ongoing coughing versus could be orthostatic.  Low suspicion for arrhythmia or valvular abnormality.  No murmur on my exam.  - Check orthostatic vitals  - Telemetry while in the hospital, low risk  - Antitussives as needed  - Check for COVID, influenza, and RSV      Right posterior 10th, 11th, and 12th rib fracture  Minimally displaced acute fracture of right transverse process of L1  Healing fractures of right anterior 3rd-6th ribs  Patient sustained right-sided rib fractures after hitting the right side of his body onto the [employer] kitchen countertop.  He is on room air.  He has mild pain with deep breathing.  -General Surgery consultation for rib fractures  - Rib fracture order set was placed  - Incentive spirometry, encourage pulmonary toilet  - Pain  "management with Tylenol, lidocaine patch, gabapentin, methocarbamol, and oxycodone  - Neurosurgical consultation for L1 fracture    Left inguinal canal rim-enhancing fluid collection  Recent hernia repair  Patient recently underwent ventral abdominal wall and left inguinal hernia repair.  He reported recovering well from the surgery and denied any abdominal discomfort or groin discomfort.  On CT imaging he was noted to have left inguinal canal rim-enhancing fluid collection about 5.3 x 4.4 cm which could represent postoperative seroma or liquefied hematoma or abscess.  Patient is hemodynamically stable.  No reports of fever or chills, no leukocytosis.  Abdominal exam is benign.  Less likely this is abscess.  Will consult general surgery to weigh in        Hypertension  Resume prior to admission medications after pharmacy med reconciliation    Hyperlipidemia  Prior to admission his statin      Recent UTI  Patient was recently diagnosed with UTI with Enterococcus faecalis.  He is on ciprofloxacin, his QTc is prolonged at 498 ms, will avoid further fluoroquinolone.  Will start patient on nitrofurantoin based on culture sensitivity data.    Prolonged QTc  Avoid QTc prolonging medications       Observation Goals: -diagnostic tests and consults completed and resulted, -vital signs normal or at patient baseline, -adequate pain control on oral analgesics, Nurse to notify provider when observation goals have been met and patient is ready for discharge.  Diet: Regular Diet Adult    DVT Prophylaxis: Pneumatic Compression Devices  Vásquez Catheter: Not present  Lines: None     Cardiac Monitoring: None  Code Status: Full Code      Clinically Significant Risk Factors Present on Admission                   # Hypertension: Noted on problem list           # Overweight: Estimated body mass index is 29.9 kg/m  as calculated from the following:    Height as of this encounter: 1.854 m (6' 1\").    Weight as of this encounter: 102.8 kg (226 " lb 10.1 oz).              Disposition Plan     Medically Ready for Discharge: Anticipated Tomorrow           Mary Ricketts MD  Hospitalist Service  [facility]  Securely message with [employer] (more info)  Text page via [employer] Paging/Directory     ______________________________________________________________________    Chief Complaint   Right-sided rib fractures  Fall    History is obtained from the patient    History of Present Illness     Ajay Bowden is a 79 year old male with past medical history significant for hypertension, hyperlipidemia, BPH, GOMEZ, recent hernia repair, recent hospitalization with UTI, recurrent episode of UTI and currently on ciprofloxacin who presented to the ER after sustaining a fall at his home and was found to have multiple right-sided rib fractures and minimally displaced acute fracture of right transverse process of L1.  Admitted to the observation for pain control.     Patient reported that he was feeling under the weather for a few days.  He is also on treatment for his UTI.  He was having cough so he decided to go to the kitchen to get some cough drops.  While in the kitchen is standing next to the kitchen countertop he started feeling dizzy/lightheaded and he fell on the marble kitchen top on his right side.  He did not lose consciousness.  He denied hitting his head.  Immediately after falling on his right side he felt a sharp pain.  He was then came to the ER for further evaluation.      Past Medical History    No past medical history on file.    Past Surgical History   No past surgical history on file.    Prior to Admission Medications   Prior to Admission Medications   Prescriptions Last Dose Informant Patient Reported? Taking?   NIFEdipine ER OSMOTIC (PROCARDIA XL) 90 MG 24 hr tablet   Yes No   Sig: Take 90 mg by mouth daily.   acetaminophen (TYLENOL) 500 MG tablet   Yes No   Sig: Take 500 mg by mouth every 6 hours as needed for mild pain.   atorvastatin  (LIPITOR) 20 MG tablet   Yes No   Sig: Take 20 mg by mouth every evening.   labetalol (NORMODYNE) 200 MG tablet   Yes No   Sig: Take 200 mg by mouth 2 times daily.   olmesartan (BENICAR) 40 MG tablet   Yes No   Sig: Take 40 mg by mouth every evening.   polyethylene glycol (MIRALAX) 17 g packet   Yes No   Sig: Take 1 packet by mouth daily as needed for constipation.   spironolactone (ALDACTONE) 12.5 mg TABS half-tab   Yes No   Sig: Take 12.5 mg by mouth daily.   venlafaxine (EFFEXOR XR) 75 MG 24 hr capsule   Yes No   Sig: Take 75 mg by mouth daily.      Facility-Administered Medications: None        Review of Systems    The 10 point Review of Systems is negative other than noted in the HPI or here.      Physical Exam   Vital Signs: Temp: 97.9  F (36.6  C) Temp src: Oral BP: (!) 169/101 Pulse: 58   Resp: 18 SpO2: 95 % O2 Device: None (Room air)    Weight: 226 lbs 10.13 oz    Constitutional: awake, alert, cooperative, no apparent distress, and appears stated age  Eyes: Anicteric sclera  ENT: normocephalic, without obvious abnormality  Respiratory: On room air, equal air entry bilaterally, no wheezing or crackles  Cardiovascular: Normal rate, regular rhythm, no murmur  GI: Soft, nontender, nondistended  Neurologic: Awake, alert, oriented x 3, no focal deficit  Neuropsychiatric: Appropriate mood and affect    Medical Decision Making       60 MINUTES SPENT BY ME on the date of service doing chart review, history, exam, documentation & further activities per the note.      Data   ------------------------- PAST 24 HR DATA REVIEWED -----------------------------------------------

## 2025-07-10 NOTE — CONSULTS
Chief complaint:  Right chest wall pain    HPI:  This patient is a 79 year old male who presents with right chest wall pain after stumbling and falling against the kitchen counter.  He hit the right side of his chest hard.  He then had significant pain and his wife found him having to call 911.  The patient has incidentally noticed some swelling in the left groin recently.  This is at the site of a previous repair of a recurrent hernia.    Past Medical History:  Hypertension  Hyperlipidemia  UTI    Past Surgical History:  Left inguinal hernia repair  Repair of recurrent left inguinal hernia     Social History:  Social History     Socioeconomic History    Marital status:      Spouse name: Not on file    Number of children: Not on file    Years of education: Not on file    Highest education level: Not on file   Occupational History    Not on file   Tobacco Use    Smoking status: Not on file    Smokeless tobacco: Not on file   Substance and Sexual Activity    Alcohol use: Not on file    Drug use: Not on file    Sexual activity: Not on file   Other Topics Concern    Not on file   Social History Narrative    Not on file     Social Drivers of Health     Financial Resource Strain: Low Risk  (7/10/2025)    Financial Resource Strain     Within the past 12 months, have you or your family members you live with been unable to get utilities (heat, electricity) when it was really needed?: No   Food Insecurity: Low Risk  (7/10/2025)    Food Insecurity     Within the past 12 months, did you worry that your food would run out before you got money to buy more?: No     Within the past 12 months, did the food you bought just not last and you didn t have money to get more?: No   Transportation Needs: Low Risk  (7/10/2025)    Transportation Needs     Within the past 12 months, has lack of transportation kept you from medical appointments, getting your medicines, non-medical meetings or appointments, work, or from getting things  that you need?: No   Physical Activity: Not on file   Stress: Not on file   Social Connections: Socially Integrated (10/1/2024)    [facility]    Social Connections     Do you often feel lonely or isolated from those around you?: 0   Interpersonal Safety: Low Risk  (7/10/2025)    Interpersonal Safety     Do you feel physically and emotionally safe where you currently live?: Yes     Within the past 12 months, have you been hit, slapped, kicked or otherwise physically hurt by someone?: No     Within the past 12 months, have you been humiliated or emotionally abused in other ways by your partner or ex-partner?: No   Housing Stability: Low Risk  (7/10/2025)    Housing Stability     Do you have housing? : Yes     Are you worried about losing your housing?: No        Family History:  No family history on file.    Review of Systems:  The 10 point Review of Systems is negative other than noted in the HPI and above.    Physical Exam:  General - This is a well developed, well nourished male in no apparent distress.  HEENT - Normocephalic, atraumatic.  No scleral icterus.  External ocular motion is intact  Neck - supple without masses  Lungs - clear to ascultation.  The right chest wall is tender to palpation posteriorly  Heart - Regular rate & rhythm without murmur  Abdomen:   soft, non-distended and nontender.    Extremities - warm without edema  The left groin reveals a 5 cm area of fullness consistent with a postoperative seroma.  This is minimally tender.  Neurologic - nonfocal  Glascow coma score is 15    Relevant labs:  White blood cell count is 7400.  Hemoglobin is 13.5.    Imaging:  CT of the chest abdomen and pelvis reveals healing fractures of the right anterior 3rd through 6th ribs.  There is a mildly displaced acute fracture of the right posterior 10th rib and a moderately displaced acute fracture of the right posterior 11th and 12th ribs.  There is a minimally  displaced acute fracture of the right transverse process of L1.  There is no evidence of pneumothorax.  There is no other acute traumatic abnormality.  The left groin reveals a fluid collection with enhancing wall thought to be either a seroma, resolving hematoma, or less likely an abscess.    Assessment and Plan:  This is a patient with multiple right rib fractures and a minimally displaced transverse process fracture of L1.  I agree with neurosurgery consultation regarding the transverse process fracture.  Regarding the patient's rib fractures, I would not anticipate any surgical therapy.  At this point, I think we can hold off on a thoracic surgery consult.  The patient has continued pain which is not improving, thoracic surgery consultation to discuss the possibility of rib plating could be considered.  At this point, I do not see any general surgical issues.  Please call if we can be of further assistance during the hospitalization.      Bladimir Thompson MD  [facility]

## 2025-07-10 NOTE — PHARMACY-ADMISSION MEDICATION HISTORY
Pharmacist Admission Medication History    Admission medication history is complete. The information provided in this note is only as accurate as the sources available at the time of the update.    Information Source(s): Patient and CareEverywhere/SureScripts via in-person    Pertinent Information:  7 days course of Cipro was started on 7/5/2025, pt has 2 days of therapy left.    Changes made to PTA medication list:  Added: Cipro  Deleted: None  Changed: None    Allergies reviewed with patient and updates made in EHR: yes    Medication History Completed By: Lorin Florian PharmD 7/10/2025 9:52 AM    PTA Med List   Medication Sig Last Dose/Taking    acetaminophen (TYLENOL) 500 MG tablet Take 500 mg by mouth every 6 hours as needed for mild pain. Taking As Needed    atorvastatin (LIPITOR) 20 MG tablet Take 20 mg by mouth every evening. 7/9/2025 Bedtime    ciprofloxacin (CIPRO) 500 MG tablet Take 500 mg by mouth 2 times daily. 7/9/2025 Evening    labetalol (NORMODYNE) 200 MG tablet Take 200 mg by mouth 2 times daily. 7/9/2025 Evening    NIFEdipine ER OSMOTIC (PROCARDIA XL) 90 MG 24 hr tablet Take 90 mg by mouth daily. 7/9/2025 Morning    olmesartan (BENICAR) 40 MG tablet Take 40 mg by mouth every evening. 7/9/2025 Evening    polyethylene glycol (MIRALAX) 17 g packet Take 1 packet by mouth daily as needed for constipation. Taking As Needed    spironolactone (ALDACTONE) 12.5 mg TABS half-tab Take 12.5 mg by mouth daily. 7/9/2025 Morning    venlafaxine (EFFEXOR XR) 75 MG 24 hr capsule Take 75 mg by mouth daily. 7/9/2025 Morning

## 2025-07-11 ENCOUNTER — APPOINTMENT (OUTPATIENT)
Dept: GENERAL RADIOLOGY | Facility: CLINIC | Age: 80
End: 2025-07-11
Attending: STUDENT IN AN ORGANIZED HEALTH CARE EDUCATION/TRAINING PROGRAM
Payer: COMMERCIAL

## 2025-07-11 ENCOUNTER — APPOINTMENT (OUTPATIENT)
Dept: PHYSICAL THERAPY | Facility: CLINIC | Age: 80
End: 2025-07-11
Attending: STUDENT IN AN ORGANIZED HEALTH CARE EDUCATION/TRAINING PROGRAM
Payer: COMMERCIAL

## 2025-07-11 LAB
ANION GAP SERPL CALCULATED.3IONS-SCNC: 10 MMOL/L (ref 7–15)
BUN SERPL-MCNC: 15.5 MG/DL (ref 8–23)
CALCIUM SERPL-MCNC: 9.5 MG/DL (ref 8.8–10.4)
CHLORIDE SERPL-SCNC: 105 MMOL/L (ref 98–107)
CREAT SERPL-MCNC: 1 MG/DL (ref 0.67–1.17)
EGFRCR SERPLBLD CKD-EPI 2021: 78 ML/MIN/1.73M2
GLUCOSE SERPL-MCNC: 130 MG/DL (ref 70–99)
HCO3 SERPL-SCNC: 25 MMOL/L (ref 22–29)
MAGNESIUM SERPL-MCNC: 2.1 MG/DL (ref 1.7–2.3)
PHOSPHATE SERPL-MCNC: 3 MG/DL (ref 2.5–4.5)
POTASSIUM SERPL-SCNC: 4 MMOL/L (ref 3.4–5.3)
SODIUM SERPL-SCNC: 140 MMOL/L (ref 135–145)

## 2025-07-11 PROCEDURE — 99232 SBSQ HOSP IP/OBS MODERATE 35: CPT | Performed by: INTERNAL MEDICINE

## 2025-07-11 PROCEDURE — 999N000157 HC STATISTIC RCP TIME EA 10 MIN

## 2025-07-11 PROCEDURE — 97161 PT EVAL LOW COMPLEX 20 MIN: CPT | Mod: GP | Performed by: PHYSICAL THERAPIST

## 2025-07-11 PROCEDURE — 120N000001 HC R&B MED SURG/OB

## 2025-07-11 PROCEDURE — 250N000013 HC RX MED GY IP 250 OP 250 PS 637: Performed by: INTERNAL MEDICINE

## 2025-07-11 PROCEDURE — 94150 VITAL CAPACITY TEST: CPT

## 2025-07-11 PROCEDURE — 36415 COLL VENOUS BLD VENIPUNCTURE: CPT | Performed by: STUDENT IN AN ORGANIZED HEALTH CARE EDUCATION/TRAINING PROGRAM

## 2025-07-11 PROCEDURE — 83735 ASSAY OF MAGNESIUM: CPT | Performed by: STUDENT IN AN ORGANIZED HEALTH CARE EDUCATION/TRAINING PROGRAM

## 2025-07-11 PROCEDURE — 84100 ASSAY OF PHOSPHORUS: CPT | Performed by: STUDENT IN AN ORGANIZED HEALTH CARE EDUCATION/TRAINING PROGRAM

## 2025-07-11 PROCEDURE — 97530 THERAPEUTIC ACTIVITIES: CPT | Mod: GP | Performed by: PHYSICAL THERAPIST

## 2025-07-11 PROCEDURE — 250N000013 HC RX MED GY IP 250 OP 250 PS 637: Performed by: STUDENT IN AN ORGANIZED HEALTH CARE EDUCATION/TRAINING PROGRAM

## 2025-07-11 PROCEDURE — 71045 X-RAY EXAM CHEST 1 VIEW: CPT

## 2025-07-11 PROCEDURE — 80048 BASIC METABOLIC PNL TOTAL CA: CPT | Performed by: STUDENT IN AN ORGANIZED HEALTH CARE EDUCATION/TRAINING PROGRAM

## 2025-07-11 RX ORDER — LABETALOL 200 MG/1
200 TABLET, FILM COATED ORAL 2 TIMES DAILY
Status: DISCONTINUED | OUTPATIENT
Start: 2025-07-11 | End: 2025-07-12 | Stop reason: HOSPADM

## 2025-07-11 RX ADMIN — GABAPENTIN 100 MG: 100 CAPSULE ORAL at 10:13

## 2025-07-11 RX ADMIN — LIDOCAINE 1 PATCH: 4 PATCH TOPICAL at 10:18

## 2025-07-11 RX ADMIN — OXYCODONE HYDROCHLORIDE 5 MG: 5 TABLET ORAL at 13:45

## 2025-07-11 RX ADMIN — METHOCARBAMOL 250 MG: 500 TABLET ORAL at 21:54

## 2025-07-11 RX ADMIN — SENNOSIDES AND DOCUSATE SODIUM 1 TABLET: 50; 8.6 TABLET ORAL at 21:54

## 2025-07-11 RX ADMIN — GABAPENTIN 100 MG: 100 CAPSULE ORAL at 13:41

## 2025-07-11 RX ADMIN — NIFEDIPINE 90 MG: 90 TABLET, EXTENDED RELEASE ORAL at 10:16

## 2025-07-11 RX ADMIN — SPIRONOLACTONE 12.5 MG: 25 TABLET ORAL at 10:15

## 2025-07-11 RX ADMIN — ACETAMINOPHEN 975 MG: 325 TABLET ORAL at 04:31

## 2025-07-11 RX ADMIN — METHOCARBAMOL 250 MG: 500 TABLET ORAL at 10:12

## 2025-07-11 RX ADMIN — VENLAFAXINE HYDROCHLORIDE 75 MG: 75 CAPSULE, EXTENDED RELEASE ORAL at 10:13

## 2025-07-11 RX ADMIN — OXYCODONE HYDROCHLORIDE 5 MG: 5 TABLET ORAL at 23:48

## 2025-07-11 RX ADMIN — LABETALOL HYDROCHLORIDE 200 MG: 200 TABLET, FILM COATED ORAL at 21:54

## 2025-07-11 RX ADMIN — ACETAMINOPHEN 975 MG: 325 TABLET ORAL at 21:54

## 2025-07-11 RX ADMIN — ACETAMINOPHEN 975 MG: 325 TABLET ORAL at 13:40

## 2025-07-11 RX ADMIN — NITROFURANTOIN MONOHYDRATE/MACROCRYSTALS 100 MG: 75; 25 CAPSULE ORAL at 10:13

## 2025-07-11 RX ADMIN — SENNOSIDES AND DOCUSATE SODIUM 1 TABLET: 50; 8.6 TABLET ORAL at 04:31

## 2025-07-11 RX ADMIN — OXYCODONE HYDROCHLORIDE 5 MG: 5 TABLET ORAL at 04:31

## 2025-07-11 RX ADMIN — ATORVASTATIN CALCIUM 20 MG: 20 TABLET, FILM COATED ORAL at 21:54

## 2025-07-11 RX ADMIN — METHOCARBAMOL 250 MG: 500 TABLET ORAL at 13:41

## 2025-07-11 ASSESSMENT — ACTIVITIES OF DAILY LIVING (ADL)
ADLS_ACUITY_SCORE: 44
ADLS_ACUITY_SCORE: 41
ADLS_ACUITY_SCORE: 41
ADLS_ACUITY_SCORE: 44
ADLS_ACUITY_SCORE: 41
ADLS_ACUITY_SCORE: 44
ADLS_ACUITY_SCORE: 41
ADLS_ACUITY_SCORE: 44
ADLS_ACUITY_SCORE: 44
ADLS_ACUITY_SCORE: 41
ADLS_ACUITY_SCORE: 41
ADLS_ACUITY_SCORE: 44
ADLS_ACUITY_SCORE: 41
ADLS_ACUITY_SCORE: 41
ADLS_ACUITY_SCORE: 44

## 2025-07-11 NOTE — PROGRESS NOTES
[facility]    Hospitalist Progress Note      Assessment & Plan   Ajay Bowden is a 79 year old man who was admitted on 7/10/2025. PMH significant for hypertension, hyperlipidemia, BPH, GOMEZ, recent hernia repair, recent hospitalization with UTI, recurrent episode of UTI and currently on ciprofloxacin who presented to the ER after sustaining a fall at his home and was found to have multiple right-sided rib fractures and minimally displaced acute fracture of right transverse process of L1.  Admitted to the observation for pain control.      Fall  Lightheadedness  COVID-19 infection  Patient reported that lately he was feeling under the weather.  He was having cough so he decided to go to the kitchen to get the cough drops.  While standing near the kitchen counter he felt lightheaded so he fell on the marble kitchen countertop hitting the right side of his chest and abdomen.  He denied losing consciousness or hitting his head.  He has history of hypertension and is on multiple antihypertensive medication.  Based on the description of his fall it appears more like vasovagal in the setting of ongoing coughing versus could be orthostatic.  Low suspicion for arrhythmia or valvular abnormality.  No murmur on my exam.  - Check orthostatic vitals  - Telemetry while in the hospital, low risk  - Antitussives as needed  - Patient found to have COVID. Patient reports symptoms x 1 month (as well as wife, who also tested positive at home). Will not start specific therapeutics given long duration of symptoms. No hypoxia. Monitor.       Right posterior 10th, 11th, and 12th rib fracture  Minimally displaced acute fracture of right transverse process of L1  Healing fractures of right anterior 3rd-6th ribs  Patient sustained right-sided rib fractures after hitting the right side of his body onto the marble kitchen countertop.  He is on room air.  He has mild pain with deep breathing.  -General Surgery consultation  for rib fractures. If pain continues to be poorly controlled, consideration could be given for thoracic surgery and rib plating.   - Rib fracture order set was placed  - Incentive spirometry, encourage pulmonary toilet  - Pain management with Tylenol, lidocaine patch, gabapentin, methocarbamol, and oxycodone  - Neurosurgical consultation for L1 fracture. THey have signed off.      Left inguinal canal rim-enhancing fluid collection  Recent hernia repair  Patient recently underwent ventral abdominal wall and left inguinal hernia repair.  He reported recovering well from the surgery and denied any abdominal discomfort or groin discomfort.  On CT imaging he was noted to have left inguinal canal rim-enhancing fluid collection about 5.3 x 4.4 cm which could represent postoperative seroma or liquefied hematoma or abscess.  Patient is hemodynamically stable.  No reports of fever or chills, no leukocytosis.  Abdominal exam is benign.  Less likely this is abscess.       Hypertension  Continue home regimen.     Hyperlipidemia  Continue PTA statin.     Recent UTI  Patient was recently diagnosed with UTI with Enterococcus faecalis.  He is on ciprofloxacin, his QTc is prolonged at 498 ms, will avoid further fluoroquinolone. Patient started on nitrofurantoin and will complete course.     Prolonged QTc  Avoid QTc prolonging medications    DVT Prophylaxis: Pneumatic Compression Devices  Code Status: Full Code  Medically Ready for Discharge: Anticipated in 2-4 Days  Expected discharge: Hopeful for discharge as early as 1-2 days depending on pain control and mobility with PT.    Luisa Temple MD FACP  Hospitalist Service  [facility]      Interval History   Patient with continued pain requiring PRN oxycodone. Encouraging mobility. Resumed home BP medications. Otherwise no acute events.     -Data reviewed today: I reviewed all new labs and imaging results over the last 24 hours.     Physical Exam   Temp: 97.9  F (36.6   C) Temp src: Oral BP: (!) 161/99 Pulse: 90   Resp: 16 SpO2: 95 % O2 Device: None (Room air)    Vitals:    07/10/25 0317 07/10/25 0653   Weight: 102.1 kg (225 lb) 102.8 kg (226 lb 10.1 oz)     Vital Signs with Ranges  Temp:  [97.3  F (36.3  C)-98.5  F (36.9  C)] 97.9  F (36.6  C)  Pulse:  [70-90] 90  Resp:  [16-18] 16  BP: (124-167)/() 161/99  SpO2:  [95 %-97 %] 95 %  I/O last 3 completed shifts:  In: 600 [P.O.:600]  Out: 400 [Urine:400]    Constitutional: Pleasant older gentleman resting in bed.  Alert and oriented x3. No acute distress.   HEENT: NCAT. EOMI. Moist oral mucosa.  Respiratory: Clear to auscultation bilaterally. No crackles or wheezes.  No increased work of breathing.  On room air.  Cardiovascular: Regular rate and rhythm at time of exam. No murmur.    GI: Distended, but soft.  Normal active bowel sounds.  Surgical scars noted.  Musculoskeletal: Right-sided posterior chest pain related to known rib fractures.   Neurologic: Alert and oriented x3. No focal neurologic deficits. Did not assess gait.      Medications   Current Facility-Administered Medications   Medication Dose Route Frequency Provider Last Rate Last Admin     Current Facility-Administered Medications   Medication Dose Route Frequency Provider Last Rate Last Admin    acetaminophen (TYLENOL) tablet 975 mg  975 mg Oral Q8H Mary Ricketts MD   975 mg at 07/11/25 1340    atorvastatin (LIPITOR) tablet 20 mg  20 mg Oral QPM Don Montes MD   20 mg at 07/10/25 2041    gabapentin (NEURONTIN) capsule 100 mg  100 mg Oral TID Mary Ricketts MD   100 mg at 07/11/25 1341    labetalol (NORMODYNE) tablet 200 mg  200 mg Oral BID Luisa Cobian MD        Lidocaine (LIDOCARE) 4 % Patch 1 patch  1 patch Transdermal Q24H Mary Ricketts MD   1 patch at 07/11/25 1018    losartan (COZAAR) tablet 100 mg  100 mg Oral QPM Don Montes MD   100 mg at 07/10/25 2042    methocarbamol (ROBAXIN) half-tab 250 mg  250 mg Oral TID Mary Ricketts MD   250  mg at 07/11/25 1341    NIFEdipine ER OSMOTIC (PROCARDIA XL) 24 hr tablet 90 mg  90 mg Oral Daily Don Montes MD   90 mg at 07/11/25 1016    nitroFURantoin macrocrystal-monohydrate (MACROBID) capsule 100 mg  100 mg Oral Q12H CHUCK (08/20) Luisa Cobian MD   100 mg at 07/11/25 1013    spironolactone (ALDACTONE) half-tab 12.5 mg  12.5 mg Oral Daily Don Montes MD   12.5 mg at 07/11/25 1015    venlafaxine (EFFEXOR XR) 24 hr capsule 75 mg  75 mg Oral Daily Don Montes MD   75 mg at 07/11/25 1013       Data   Recent Labs   Lab 07/11/25  1144 07/10/25  0326   WBC  --  7.4   HGB  --  13.5   MCV  --  87   PLT  --  279    139   POTASSIUM 4.0 4.3   CHLORIDE 105 105   CO2 25 22   BUN 15.5 15.9   CR 1.00 0.94   ANIONGAP 10 12   YASIR 9.5 9.5   * 123*   ALBUMIN  --  4.2   PROTTOTAL  --  6.7   BILITOTAL  --  0.4   ALKPHOS  --  66   ALT  --  29   AST  --  27   LIPASE  --  24       Recent Results (from the past 24 hours)   XR Chest 1 View    Narrative    EXAM: XR CHEST 1 VIEW  LOCATION: [facility]  DATE: 7/11/2025    INDICATION: Trauma Patient with Rib Fracture(s)  COMPARISON: 7/10/2025 CT      Impression    IMPRESSION: Increased right lung base atelectasis and probable small right effusion. Mild elevation right hemidiaphragm. No visible pneumothorax. Mild left lung base atelectasis. Known acute lower posterior right rib fractures are occult and obscured.   Stable heart size.

## 2025-07-11 NOTE — PROGRESS NOTES
"   07/11/25 1620   Appointment Info   Signing Clinician's Name / Credentials (PT) Corina Hector DPT   Living Environment   Living Environment Comments Lives in condo with spouse, can stay on main level if desired; has x3 steps to enter with B rails; IND with all mobility, ADLs, and IADLs except spouse does most of the driving   Self-Care   Usual Activity Tolerance good   Current Activity Tolerance moderate   Fall history within last six months yes   Number of times patient has fallen within last six months   (\"every 2-3 weeks or so\")   General Information   Onset of Illness/Injury or Date of Surgery 07/10/25   Referring Physician Mary Ricketts MD   Patient/Family Therapy Goals Statement (PT) to go home   Pertinent History of Current Problem (include personal factors and/or comorbidities that impact the POC) 79 year old male with past medical history significant for hypertension, hyperlipidemia, BPH, GOMEZ, recent hernia repair, recent hospitalization with UTI, recurrent episode of UTI and currently on ciprofloxacin who presented to the ER after sustaining a fall at his home and was found to have multiple right-sided rib fractures and minimally displaced acute fracture of right transverse process of L1.   Existing Precautions/Restrictions fall;spinal   General Observations supine, NAD   Cognition   Affect/Mental Status (Cognition) WFL   Orientation Status (Cognition) oriented x 4   Follows Commands (Cognition) WFL   Pain Assessment   Patient Currently in Pain   (c/o back/rib pain but did not rate 0-10; reports pain is tolerable)   Integumentary/Edema   Integumentary/Edema Comments bruising noted over area of rib fx   Posture    Posture Protracted shoulders;Forward head position   Range of Motion (ROM)   Range of Motion ROM is WFL   Strength (Manual Muscle Testing)   Strength (Manual Muscle Testing) Deficits observed during functional mobility   Strength Comments appears more limited by pain than weakness   Bed Mobility "   Comment, (Bed Mobility) supine <> sit wtih CGA and cues for log roll   Transfers   Comment, (Transfers) sit <> stand with 2WW, CGA, initially pulls to walker   Gait/Stairs (Locomotion)   [place] Level (Gait) contact guard   Assistive Device (Gait) walker, front-wheeled   Distance in Feet (Gait) 5' for eval; add'l for treat   Pattern (Gait) step-through   Balance   Balance Comments Fair, no overt LOB during session but here after fall with injury   Clinical Impression   Criteria for Skilled Therapeutic Intervention Yes, treatment indicated   PT Diagnosis (PT) decreased functional mobility   Influenced by the following impairments pain, impaired balance, weakness   Functional limitations due to impairments bed mobility, transfers, ambulation, stair climbing   Clinical Presentation (PT Evaluation Complexity) stable   Clinical Presentation Rationale clinical judgement   Clinical Decision Making (Complexity) low complexity   Planned Therapy Interventions (PT) bed mobility training;balance training;gait training;home exercise program;neuromuscular re-education;patient/family education;postural re-education;stair training;strengthening;transfer training;progressive activity/exercise;risk factor education;home program guidelines   Risk & Benefits of therapy have been explained evaluation/treatment results reviewed;care plan/treatment goals reviewed;risks/benefits reviewed;current/potential barriers reviewed;participants voiced agreement with care plan;participants included;patient   PT Total Evaluation Time   PT Eval, Low Complexity Minutes (35266) 8   Physical Therapy Goals   PT Frequency Daily   PT Predicted Duration/Target Date for Goal Attainment 07/15/25   PT Goals Bed Mobility;Transfers;Stairs;Gait   PT: Bed Mobility Supervision/stand-by assist;Supine to/from sit;Within precautions   PT: Transfers Supervision/stand-by assist;Sit to/from stand;Bed to/from chair;Assistive device;Within precautions   PT: Gait  Supervision/stand-by assist;Assistive device;150 feet   PT: Stairs Supervision/stand-by assist;3 stairs;Rail on both sides   PT Discharge Planning   PT Plan review spinal prec/bed mobility, inc amb distance trial without AD, trial step ups   PT Discharge Recommendation (DC Rec) home with assist   PT Rationale for DC Rec Anticipate with 1-2 more add'l PT sessions, will demo adequate mobility for safe discharge home with spouse support.   PT Brief overview of current status Ax1 with 2WW   PT Total Distance Amb During Session (feet) 90   Physical Therapy Time and Intention   Timed Code Treatment Minutes 27   Total Session Time (sum of timed and untimed services) 35

## 2025-07-11 NOTE — PLAN OF CARE
"Goal Outcome Evaluation:      Plan of Care Reviewed With: patient    Overall Patient Progress: improvingOverall Patient Progress: improving    Outcome Evaluation: Pt is alert and oriented x4. VSS and on room air. Infrequent  non productive cough. Reports pain when deep breathing. Encouraged to use IS when awake and is compliant. Unsteady gait. Up with SBA with gait belt and walker. Utilizing bedside urinal. POC ongoing    Blood pressure 131/80, pulse 75, temperature 97.5  F (36.4  C), temperature source Oral, resp. rate 18, height 1.854 m (6' 1\"), weight 102.8 kg (226 lb 10.1 oz), SpO2 95%.       Problem: Adult Inpatient Plan of Care  Goal: Plan of Care Review  Description: The Plan of Care Review/Shift note should be completed every shift.  The Outcome Evaluation is a brief statement about your assessment that the patient is improving, declining, or no change.  This information will be displayed automatically on your shift  note.  Outcome: Progressing  Flowsheets (Taken 7/11/2025 3278)  Outcome Evaluation: Pt is alert and oriented x4. VSS and on room air. Infrequent  non productive cough. Reports pain when deep breathing. Encouraged to use IS when awake and is compliant. Unsteady gait. Up with SBA with gait belt and walker. Utilizing bedside urinal. POC ongoing  Plan of Care Reviewed With: patient  Overall Patient Progress: improving  Goal: Patient-Specific Goal (Individualized)  Description: You can add care plan individualizations to a care plan. Examples of Individualization might be:  \"Parent requests to be called daily at 9am for status\", \"I have a hard time hearing out of my right ear\", or \"Do not touch me to wake me up as it startles  me\".  Outcome: Progressing  Goal: Absence of Hospital-Acquired Illness or Injury  Outcome: Progressing  Goal: Optimal Comfort and Wellbeing  Outcome: Progressing  Goal: Readiness for Transition of Care  Outcome: Progressing     Problem: Delirium  Goal: Optimal Coping  Outcome: " Progressing  Goal: Improved Behavioral Control  Outcome: Progressing  Goal: Improved Attention and Thought Clarity  Outcome: Progressing  Goal: Improved Sleep  Outcome: Progressing     Problem: Skin Injury Risk Increased  Goal: Skin Health and Integrity  Outcome: Progressing  Intervention: Plan: Nurse Driven Intervention: Moisture Management  Recent Flowsheet Documentation  Taken 7/10/2025 2000 by Arleen Sloan RN  Moisture Interventions: Encourage regular toileting  Bathing/Skin Care: incontinence care     Problem: Orthopaedic Fracture  Goal: Absence of Bleeding  Outcome: Progressing  Goal: Bowel Elimination  Outcome: Progressing  Goal: Absence of Embolism Signs and Symptoms  Outcome: Progressing  Goal: Fracture Stability  Outcome: Progressing  Goal: Optimal Functional Ability  Outcome: Progressing  Goal: Absence of Infection Signs and Symptoms  Outcome: Progressing  Goal: Effective Tissue Perfusion  Outcome: Progressing  Goal: Optimal Pain Control and Function  Outcome: Progressing  Goal: Effective Oxygenation and Ventilation  Outcome: Progressing  Intervention: Promote Airway Secretion Clearance  Recent Flowsheet Documentation  Taken 7/10/2025 2200 by Arleen Sloan RN  Cough And Deep Breathing: done independently per patient  Taken 7/10/2025 2000 by Arleen Sloan RN  Cough And Deep Breathing: done with encouragement

## 2025-07-11 NOTE — PLAN OF CARE
"Goal Outcome Evaluation:      Plan of Care Reviewed With: patient    Overall Patient Progress: improvingOverall Patient Progress: improving    Outcome Evaluation: A&Ox4. VSS except BP elevated. Provider notified. Assist x 1 with gait belt and walker. O2 Sat 96% on room air. LS clear. C/o lower back and R rib pain. Scheduled Tylenol, flexeril, gabapentin and PRN oxycodone 5 mg given. Chest X-Ray done today. Tele SR HR 87. Denies nausea or SOB      Problem: Adult Inpatient Plan of Care  Goal: Plan of Care Review  Description: The Plan of Care Review/Shift note should be completed every shift.  The Outcome Evaluation is a brief statement about your assessment that the patient is improving, declining, or no change.  This information will be displayed automatically on your shift  note.  Outcome: Progressing  Flowsheets (Taken 7/11/2025 1450)  Outcome Evaluation: A&Ox4. VSS except BP elevated. Provider notified. Assist x 1 with gait belt and walker. O2 Sat 96% on room air. LS clear. C/o lower back and R rib pain. Scheduled Tylenol, flexeril, gabapentin and PRN oxycodone 5 mg given. Chest X-Ray done today. Tele SR HR 87. Denies nausea or SOB  Plan of Care Reviewed With: patient  Overall Patient Progress: improving  Goal: Patient-Specific Goal (Individualized)  Description: You can add care plan individualizations to a care plan. Examples of Individualization might be:  \"Parent requests to be called daily at 9am for status\", \"I have a hard time hearing out of my right ear\", or \"Do not touch me to wake me up as it startles  me\".  Outcome: Progressing  Goal: Absence of Hospital-Acquired Illness or Injury  Outcome: Progressing  Intervention: Identify and Manage Fall Risk  Recent Flowsheet Documentation  Taken 7/11/2025 1005 by Loan Mueller RN  Safety Promotion/Fall Prevention:   activity supervised   clutter free environment maintained   nonskid shoes/slippers when out of bed   safety round/check completed  Intervention: " Prevent Skin Injury  Recent Flowsheet Documentation  Taken 7/11/2025 1005 by Loan Mueller RN  Body Position: position changed independently  Intervention: Prevent and Manage VTE (Venous Thromboembolism) Risk  Recent Flowsheet Documentation  Taken 7/11/2025 1005 by Loan Mueller RN  VTE Prevention/Management: SCDs off (sequential compression devices)  Intervention: Prevent Infection  Recent Flowsheet Documentation  Taken 7/11/2025 1005 by Loan Mueller RN  Infection Prevention:   hand hygiene promoted   personal protective equipment utilized   single patient room provided  Goal: Optimal Comfort and Wellbeing  Outcome: Progressing  Intervention: Monitor Pain and Promote Comfort  Recent Flowsheet Documentation  Taken 7/11/2025 1420 by Loan Mueller RN  Pain Management Interventions: repositioned  Taken 7/11/2025 1339 by Loan Mueller RN  Pain Management Interventions: medication (see MAR)  Taken 7/11/2025 1100 by Loan Mueller RN  Pain Management Interventions: relaxation techniques promoted  Taken 7/11/2025 1005 by Loan Mueller RN  Pain Management Interventions: medication (see MAR)  Intervention: Provide Person-Centered Care  Recent Flowsheet Documentation  Taken 7/11/2025 1005 by Loan Mueller RN  Trust Relationship/Rapport: care explained  Goal: Readiness for Transition of Care  Outcome: Progressing

## 2025-07-12 ENCOUNTER — APPOINTMENT (OUTPATIENT)
Dept: PHYSICAL THERAPY | Facility: CLINIC | Age: 80
End: 2025-07-12
Payer: COMMERCIAL

## 2025-07-12 ENCOUNTER — APPOINTMENT (OUTPATIENT)
Dept: GENERAL RADIOLOGY | Facility: CLINIC | Age: 80
End: 2025-07-12
Attending: STUDENT IN AN ORGANIZED HEALTH CARE EDUCATION/TRAINING PROGRAM
Payer: COMMERCIAL

## 2025-07-12 VITALS
WEIGHT: 226.63 LBS | DIASTOLIC BLOOD PRESSURE: 90 MMHG | BODY MASS INDEX: 30.04 KG/M2 | HEIGHT: 73 IN | OXYGEN SATURATION: 96 % | SYSTOLIC BLOOD PRESSURE: 157 MMHG | HEART RATE: 65 BPM | RESPIRATION RATE: 18 BRPM | TEMPERATURE: 97.8 F

## 2025-07-12 LAB
ANION GAP SERPL CALCULATED.3IONS-SCNC: 11 MMOL/L (ref 7–15)
BASOPHILS # BLD AUTO: 0 10E3/UL (ref 0–0.2)
BASOPHILS NFR BLD AUTO: 0 %
BUN SERPL-MCNC: 17.3 MG/DL (ref 8–23)
CALCIUM SERPL-MCNC: 9.4 MG/DL (ref 8.8–10.4)
CHLORIDE SERPL-SCNC: 107 MMOL/L (ref 98–107)
CREAT SERPL-MCNC: 0.89 MG/DL (ref 0.67–1.17)
EGFRCR SERPLBLD CKD-EPI 2021: 88 ML/MIN/1.73M2
EOSINOPHIL # BLD AUTO: 0.4 10E3/UL (ref 0–0.7)
EOSINOPHIL NFR BLD AUTO: 5 %
ERYTHROCYTE [DISTWIDTH] IN BLOOD BY AUTOMATED COUNT: 13.9 % (ref 10–15)
GLUCOSE SERPL-MCNC: 109 MG/DL (ref 70–99)
HCO3 SERPL-SCNC: 22 MMOL/L (ref 22–29)
HCT VFR BLD AUTO: 41.6 % (ref 40–53)
HGB BLD-MCNC: 14.2 G/DL (ref 13.3–17.7)
IMM GRANULOCYTES # BLD: 0 10E3/UL
IMM GRANULOCYTES NFR BLD: 0 %
LYMPHOCYTES # BLD AUTO: 2 10E3/UL (ref 0.8–5.3)
LYMPHOCYTES NFR BLD AUTO: 22 %
MAGNESIUM SERPL-MCNC: 2 MG/DL (ref 1.7–2.3)
MCH RBC QN AUTO: 29.1 PG (ref 26.5–33)
MCHC RBC AUTO-ENTMCNC: 34.1 G/DL (ref 31.5–36.5)
MCV RBC AUTO: 85 FL (ref 78–100)
MONOCYTES # BLD AUTO: 1 10E3/UL (ref 0–1.3)
MONOCYTES NFR BLD AUTO: 11 %
NEUTROPHILS # BLD AUTO: 5.5 10E3/UL (ref 1.6–8.3)
NEUTROPHILS NFR BLD AUTO: 62 %
NRBC # BLD AUTO: 0 10E3/UL
NRBC BLD AUTO-RTO: 0 /100
PHOSPHATE SERPL-MCNC: 4 MG/DL (ref 2.5–4.5)
PLATELET # BLD AUTO: 269 10E3/UL (ref 150–450)
POTASSIUM SERPL-SCNC: 4.1 MMOL/L (ref 3.4–5.3)
RBC # BLD AUTO: 4.88 10E6/UL (ref 4.4–5.9)
SODIUM SERPL-SCNC: 140 MMOL/L (ref 135–145)
WBC # BLD AUTO: 8.9 10E3/UL (ref 4–11)

## 2025-07-12 PROCEDURE — 71045 X-RAY EXAM CHEST 1 VIEW: CPT

## 2025-07-12 PROCEDURE — 99239 HOSP IP/OBS DSCHRG MGMT >30: CPT | Performed by: INTERNAL MEDICINE

## 2025-07-12 PROCEDURE — 83735 ASSAY OF MAGNESIUM: CPT | Performed by: INTERNAL MEDICINE

## 2025-07-12 PROCEDURE — 250N000013 HC RX MED GY IP 250 OP 250 PS 637: Performed by: INTERNAL MEDICINE

## 2025-07-12 PROCEDURE — 36415 COLL VENOUS BLD VENIPUNCTURE: CPT | Performed by: INTERNAL MEDICINE

## 2025-07-12 PROCEDURE — 97116 GAIT TRAINING THERAPY: CPT | Mod: GP

## 2025-07-12 PROCEDURE — 80048 BASIC METABOLIC PNL TOTAL CA: CPT | Performed by: INTERNAL MEDICINE

## 2025-07-12 PROCEDURE — 84100 ASSAY OF PHOSPHORUS: CPT | Performed by: INTERNAL MEDICINE

## 2025-07-12 PROCEDURE — 85025 COMPLETE CBC W/AUTO DIFF WBC: CPT | Performed by: INTERNAL MEDICINE

## 2025-07-12 PROCEDURE — 97530 THERAPEUTIC ACTIVITIES: CPT | Mod: GP

## 2025-07-12 PROCEDURE — 250N000013 HC RX MED GY IP 250 OP 250 PS 637: Performed by: STUDENT IN AN ORGANIZED HEALTH CARE EDUCATION/TRAINING PROGRAM

## 2025-07-12 RX ORDER — NITROFURANTOIN 25; 75 MG/1; MG/1
100 CAPSULE ORAL 2 TIMES DAILY
Qty: 6 CAPSULE | Refills: 0 | Status: SHIPPED | OUTPATIENT
Start: 2025-07-12

## 2025-07-12 RX ORDER — LIDOCAINE 4 G/G
1 PATCH TOPICAL EVERY 24 HOURS
Qty: 10 PATCH | Refills: 1 | Status: SHIPPED | OUTPATIENT
Start: 2025-07-12

## 2025-07-12 RX ORDER — ACETAMINOPHEN 500 MG
1000 TABLET ORAL 3 TIMES DAILY
COMMUNITY
Start: 2025-07-12

## 2025-07-12 RX ORDER — METHOCARBAMOL 500 MG/1
250 TABLET, FILM COATED ORAL 3 TIMES DAILY PRN
Qty: 30 TABLET | Refills: 1 | Status: SHIPPED | OUTPATIENT
Start: 2025-07-12

## 2025-07-12 RX ORDER — GABAPENTIN 100 MG/1
CAPSULE ORAL
Qty: 90 CAPSULE | Refills: 0 | Status: SHIPPED | OUTPATIENT
Start: 2025-07-12

## 2025-07-12 RX ADMIN — METHOCARBAMOL 250 MG: 500 TABLET ORAL at 13:15

## 2025-07-12 RX ADMIN — ACETAMINOPHEN 975 MG: 325 TABLET ORAL at 11:32

## 2025-07-12 RX ADMIN — GABAPENTIN 100 MG: 100 CAPSULE ORAL at 09:01

## 2025-07-12 RX ADMIN — SPIRONOLACTONE 12.5 MG: 25 TABLET ORAL at 09:02

## 2025-07-12 RX ADMIN — GABAPENTIN 100 MG: 100 CAPSULE ORAL at 13:15

## 2025-07-12 RX ADMIN — METHOCARBAMOL 250 MG: 500 TABLET ORAL at 09:01

## 2025-07-12 RX ADMIN — ACETAMINOPHEN 975 MG: 325 TABLET ORAL at 04:23

## 2025-07-12 RX ADMIN — LIDOCAINE 1 PATCH: 4 PATCH TOPICAL at 09:05

## 2025-07-12 RX ADMIN — LABETALOL HYDROCHLORIDE 200 MG: 200 TABLET, FILM COATED ORAL at 09:02

## 2025-07-12 RX ADMIN — NIFEDIPINE 90 MG: 90 TABLET, EXTENDED RELEASE ORAL at 09:02

## 2025-07-12 RX ADMIN — VENLAFAXINE HYDROCHLORIDE 75 MG: 75 CAPSULE, EXTENDED RELEASE ORAL at 09:01

## 2025-07-12 RX ADMIN — NITROFURANTOIN MONOHYDRATE/MACROCRYSTALS 100 MG: 75; 25 CAPSULE ORAL at 09:02

## 2025-07-12 ASSESSMENT — ACTIVITIES OF DAILY LIVING (ADL)
ADLS_ACUITY_SCORE: 41

## 2025-07-12 NOTE — DISCHARGE SUMMARY
[facility]    Discharge Summary  Hospitalist    Date of Admission:  7/10/2025  Date of Discharge:  7/12/2025  Discharging Provider: Luisa Temple MD  Date of Service (when I saw the patient): 07/12/25    Discharge Diagnoses   Fall  Lightheadedness  COVID-19 infection  Right posterior 10th, 11th, and 12th rib fracture  Minimally displaced acute fracture of right transverse process of L1  Healing fractures of right anterior 3rd-6th ribs  Left inguinal canal rim-enhancing fluid collection  Recent hernia repair  Hypertension  Hyperlipidemia  Recent UTI  Prolonged QTc    History of Present Illness   Ajay Bowden is a 79 year old man who was admitted on 7/10/2025. PMH significant for hypertension, hyperlipidemia, BPH, GOMEZ, recent hernia repair, recent hospitalization with UTI, recurrent episode of UTI and currently on ciprofloxacin who presented to the ER after sustaining a fall at his home and was found to have multiple right-sided rib fractures and minimally displaced acute fracture of right transverse process of L1.  Admitted to the observation for pain control.      Hospital Course   Aajy Bowden was admitted on 7/10/2025.  The following problems were addressed during his hospitalization:    Fall  Lightheadedness  COVID-19 infection  Patient reported that lately he was feeling under the weather.  He was having cough so he decided to go to the kitchen to get the cough drops.  While standing near the kitchen counter he felt lightheaded so he fell on the [employer] kitchen countertop hitting the right side of his chest and abdomen.  He denied losing consciousness or hitting his head.  He has history of hypertension and is on multiple antihypertensive medication.  Based on the description of his fall it appears more like vasovagal in the setting of ongoing coughing versus could be orthostatic.  Low suspicion for arrhythmia or valvular abnormality.  No murmur.  - Patient is not orthostatic.  - No  arrhythmias noted on telemetry.   - Patient is not requiring any of the PRN antitussives ordered  - Patient was found to have COVID. Patient reports symptoms x 1 month (as well as wife, who also tested positive at home after his positive test). Will not start specific therapeutics given long duration of cough without any hypoxia or clinical worsening.      Right posterior 10th, 11th, and 12th rib fracture  Minimally displaced acute fracture of right transverse process of L1  Healing fractures of right anterior 3rd-6th ribs  Patient sustained right-sided rib fractures after hitting the right side of his body onto the marble kitchen countertop.  He is on room air.  He has mild pain with deep breathing.  -General Surgery consultation for rib fractures. If pain continues to be poorly controlled, consideration could be given for thoracic surgery and rib plating. Pain has improved and patient is transferring well. Able to discharge home.  - Rib fracture order set was placed  - Incentive spirometry  - Pain management with Tylenol, lidocaine patch, gabapentin, PRN methocarbamol. Patient has not taken any of the as needed oxycodone since 7/11 evening.  He does not want any oxycodone on discharge.  - Neurosurgical consultation for L1 transverse process fracture. They have signed off.  Surgical intervention recommended.  Light activity recommended.     Left inguinal canal rim-enhancing fluid collection  Recent hernia repair  Patient recently underwent ventral abdominal wall and left inguinal hernia repair.  He reported recovering well from the surgery and denied any abdominal discomfort or groin discomfort.  On CT imaging he was noted to have left inguinal canal rim-enhancing fluid collection about 5.3 x 4.4 cm which could represent postoperative seroma or liquefied hematoma or abscess.  Patient is hemodynamically stable.  No reports of fever or chills, no leukocytosis.  Abdominal exam is benign.     Hypertension  Continue  home regimen.     Hyperlipidemia  Continue PTA statin.     Recent UTI  Patient was recently diagnosed with UTI with urine culture having grown Enterococcus faecalis.  He was on ciprofloxacin and his QTc was prolonged at 498 ms. Cipro changed to nitrofurantoin and will complete course.     Prolonged QTc  Avoid QTc prolonging medications.    Luisa Temple MD FACP  Hospitalist Service  [facility]      Significant Results and Procedures   Imaging results below    Pending Results   N/A    Code Status   Full Code       Primary Care Physician   [facility]    Physical Exam   Temp: 97.8  F (36.6  C) Temp src: Oral BP: (!) 157/90 Pulse: 65   Resp: 18 SpO2: 96 % O2 Device: None (Room air)    Vitals:    07/10/25 0317 07/10/25 0653   Weight: 102.1 kg (225 lb) 102.8 kg (226 lb 10.1 oz)     Vital Signs with Ranges  Temp:  [97.8  F (36.6  C)-98.1  F (36.7  C)] 97.8  F (36.6  C)  Pulse:  [] 65  Resp:  [16-18] 18  BP: (119-165)/() 157/90  SpO2:  [93 %-97 %] 96 %  I/O last 3 completed shifts:  In: 840 [P.O.:840]  Out: 200 [Urine:200]    Constitutional: Pleasant older gentleman resting in bed.  Alert and oriented x3. No acute distress.   HEENT: NCAT. EOMI. Moist oral mucosa.  Respiratory: Clear to auscultation bilaterally. No crackles or wheezes.  No increased work of breathing.  On room air.  Cardiovascular: Regular rate and rhythm at time of exam. No murmur.    GI: Distended, but soft.  Normoactive bowel sounds.  Surgical scars noted.  Musculoskeletal: Right-sided posterior chest pain related to known rib fractures.   Neurologic: Alert and oriented x3. No focal neurologic deficits. Did not assess gait.    Discharge Disposition   Discharged to home  Condition at discharge: Stable    Consultations This Hospital Stay   SURGERY GENERAL IP CONSULT  PHYSICAL THERAPY ADULT IP CONSULT  SURGERY GENERAL IP CONSULT  NEUROSURGERY IP CONSULT  SOCIAL WORK IP CONSULT  CARE MANAGEMENT / SOCIAL WORK IP  CONSULT  OCCUPATIONAL THERAPY ADULT IP CONSULT    Time Spent on this Encounter   ILuisa MD, personally saw the patient today and spent greater than 30 minutes discharging this patient.    Discharge Orders      Reason for your hospital stay    You were hospitalized for evaluation for evaluation after a fall which resulted in fractures to your right 10th, 11th, and 12th ribs, as well as a minimally displaced fracture of the right transverse process of your L1 (lumbar) vertebrae. You were seen by neurosurgery and general surgery. No surgical interventions recommended at this time. Light activity has been recommended. You were also found to have a COVID infection. Based on the duration of your cough and minimal symptoms / no oxygen requirements, you do not need any additional treatment for this. Will continue scheduled Tylenol and gabapentin initially for pain. You can take methocarbamol (Robaxin) as needed for muscle spasms. You can continue to use a lidocaine patch daily if it helps with your pain. Hopefully the pain will continue to improve quickly. Continue to take your stool softeners at home. Continue to use the incentive spirometer to practice taking deep breaths. Take nitrofurantoin (Macrobid) twice daily for another 6 doses to treat your previous urinary tract infection.  Follow up with your primary care provider within the next week. If the pain from the rib fractures worsens or is unable to be managed with conservative measures, you could return to the ER / see a thoracic surgeon for evaluation of intervention (rib plating), but do not anticipate that this will be needed.     Activity    Your activity upon discharge: light activity as tolerated     Diet    Follow this diet upon discharge: Regular diet     Hospital Follow-up with Existing Primary Care Provider (PCP)          Discharge Medications   Current Discharge Medication List        START taking these medications    Details   gabapentin  (NEURONTIN) 100 MG capsule Take 1 capsule (100 mg) orally three times daily for the next 7 days. Then, you can take it three times daily as needed for nerve pain related to your rib fracture.  Qty: 90 capsule, Refills: 0    Associated Diagnoses: Closed fracture of one rib of right side, initial encounter      Lidocaine (LIDOCARE) 4 % Patch Place 1 patch over 12 hours onto the skin every 24 hours. To prevent lidocaine toxicity, patient should be patch free for 12 hrs daily. Apply to affected area.  Qty: 10 patch, Refills: 1    Associated Diagnoses: Closed fracture of one rib of right side, initial encounter      methocarbamol (ROBAXIN) 500 MG tablet Take 0.5 tablets (250 mg) by mouth 3 times daily as needed for muscle spasms (related to your rib fractures).  Qty: 30 tablet, Refills: 1    Associated Diagnoses: Closed fracture of one rib of right side, initial encounter      nitroFURantoin macrocrystal-monohydrate (MACROBID) 100 MG capsule Take 1 capsule (100 mg) by mouth 2 times daily.  Qty: 6 capsule, Refills: 0    Associated Diagnoses: Closed fracture of one rib of right side, initial encounter           CONTINUE these medications which have CHANGED    Details   acetaminophen (TYLENOL) 500 MG tablet Take 2 tablets (1,000 mg) by mouth 3 times daily. Continue to take this scheduled for at least the next week after your rib fractures. You can start taking as needed as your pain continues to improve.    Associated Diagnoses: Closed fracture of one rib of right side, initial encounter           CONTINUE these medications which have NOT CHANGED    Details   atorvastatin (LIPITOR) 20 MG tablet Take 20 mg by mouth every evening.      labetalol (NORMODYNE) 200 MG tablet Take 200 mg by mouth 2 times daily.      NIFEdipine ER OSMOTIC (PROCARDIA XL) 90 MG 24 hr tablet Take 90 mg by mouth daily.      olmesartan (BENICAR) 40 MG tablet Take 40 mg by mouth every evening.      polyethylene glycol (MIRALAX) 17 g packet Take 1 packet  by mouth daily as needed for constipation.      spironolactone (ALDACTONE) 12.5 mg TABS half-tab Take 12.5 mg by mouth daily.      venlafaxine (EFFEXOR XR) 75 MG 24 hr capsule Take 75 mg by mouth daily.           STOP taking these medications       ciprofloxacin (CIPRO) 500 MG tablet Comments:   Reason for Stopping:             Allergies   Allergies   Allergen Reactions    Hydrochlorothiazide Other (See Comments)     Stopped 7/2012 due to urinary freq and urgency     Data   Most Recent 3 CBC's:  Recent Labs   Lab Test 07/12/25  0558 07/10/25  0326 05/24/25  0913   WBC 8.9 7.4 8.6   HGB 14.2 13.5 13.0*   MCV 85 87 87    279 280      Most Recent 3 BMP's:  Recent Labs   Lab Test 07/12/25  0558 07/11/25  1144 07/10/25  0326    140 139   POTASSIUM 4.1 4.0 4.3   CHLORIDE 107 105 105   CO2 22 25 22   BUN 17.3 15.5 15.9   CR 0.89 1.00 0.94   ANIONGAP 11 10 12   YASIR 9.4 9.5 9.5   * 130* 123*     Most Recent 2 LFT's:  Recent Labs   Lab Test 07/10/25  0326   AST 27   ALT 29   ALKPHOS 66   BILITOTAL 0.4     Results for orders placed or performed during the hospital encounter of 07/10/25   CT Chest/Abdomen/Pelvis w Contrast    Narrative    EXAM: CT CHEST/ABDOMEN/PELVIS W CONTRAST  LOCATION: [facility]  DATE: 7/10/2025    INDICATION: right flank and posterior right chest wall pain and tenderness following a fall  COMPARISON: Chest/right rib radiographs 05/21/2025, CT abdomen/pelvis without contrast 04/02/2025  TECHNIQUE: CT scan of the chest, abdomen, and pelvis was performed following injection of IV contrast. Multiplanar reformats were obtained. Dose reduction techniques were used.   CONTRAST: 100mL Isovue 370    FINDINGS:   LUNGS AND PLEURA: No significant pulmonary contusion, or evidence of pneumothorax.    MEDIASTINUM/AXILLAE: No lymphadenopathy or pericardial effusion. No mediastinal hemorrhage. Heart is mildly enlarged. Trace pericardial effusion.    CORONARY ARTERY CALCIFICATION:  Moderate.    HEPATOBILIARY: Left hepatic lobe is somewhat atrophied. Unremarkable gallbladder.    PANCREAS: Somewhat atrophic pancreas, otherwise unremarkable.    SPLEEN: Normal.    ADRENAL GLANDS: Left adrenal myelolipoma. Unremarkable right adrenal gland.     KIDNEYS/BLADDER: Renal cysts which require no dedicated follow-up. Small nonobstructing calyceal calculi. No ureterolithiasis or hydronephrosis. Urinary bladder is unremarkable.    BOWEL: Colonic diverticulosis. No bowel obstruction.    LYMPH NODES: Normal.    VASCULATURE: Atherosclerotic calcifications of the aortoiliac vessels without evidence of aneurysmal dilatation.    PELVIC ORGANS: Prominent prostate with central calcifications.    MUSCULOSKELETAL: Prior ventral abdominal wall and left inguinal hernia repair. Within the left inguinal canal there is a 5.3 x 4.4 cm rim-enhancing fluid collection (series 3 image 322), which could represent a post operative seroma or liquefied   hematoma, though an abscess could also have this appearance. 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 Healing fractures of the right anterior 3rd - 6th ribs. Mildly displaced   acute fracture of the right posterior 10th rib. Moderately displaced acute fractures of the right posterior 11th and 12th ribs. Minimally displaced acute fracture of the right transverse process of L1. Degenerative changes of the lumbosacral spine.       Impression    IMPRESSION:  1.  Healing fractures of the right anterior 3rd - 6th ribs.   2.  Mildly displaced acute fracture of the right posterior 10th rib. Moderately displaced acute fractures of the right posterior 11th and 12th ribs. Minimally displaced acute fracture of the right transverse process of L1.  3.  No significant pulmonary contusion, or evidence of pneumothorax.  4.  No other acute traumatic abnormality to the chest, abdomen, or pelvis.  5.  Prior ventral abdominal  wall and left inguinal hernia repair. Within the left inguinal canal there is a 5.3 x 4.4 cm rim-enhancing fluid collection, which could represent a post operative seroma or liquefied hematoma, though an abscess could also   have this appearance.  6.  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    XR Chest 1 View    Narrative    EXAM: XR CHEST 1 VIEW  LOCATION: [facility]  DATE: 2025    INDICATION: Trauma Patient with Rib Fracture(s)  COMPARISON: 7/10/2025 CT      Impression    IMPRESSION: Increased right lung base atelectasis and probable small right effusion. Mild elevation right hemidiaphragm. No visible pneumothorax. Mild left lung base atelectasis. Known acute lower posterior right rib fractures are occult and obscured.   Stable heart size.   XR Chest Port 1 View    Narrative    EXAM: XR CHEST PORT 1 VIEW  LOCATION: [facility]  DATE: 2025    INDICATION: Trauma Patient with Rib Fracture(s)  COMPARISON: 2025      Impression    IMPRESSION: Known right rib fractures. Right basilar subsegmental atelectasis and right hemidiaphragm elevation. Left costophrenic angle is excluded by collimation. Within this limitation, no significant pleural fluid on the current study. Streaky   opacities at the left lung base favoring atelectasis though early infectious infiltrate could have a similar appearance. Stable normal heart size without pulmonary vascular congestion. Atherosclerotic calcifications of the aortic arch and descending   thoracic aorta.   Echocardiogram Complete     Value    LVEF  55-60%    Narrative    [id number]  [id number]  [id number]  [id number]^JAIDEN^AL^MARICHUY     [facility]  Echocardiography Laboratory  [address] [place] [address]     Name: KARLY REES  MRN: [id number]  : 1945  Study Date: 07/10/2025 12:22 PM  Age: 79 yrs  Gender:  Male  Patient Location: [facility]  Reason For Study: Syncope and Collapse  Ordering Physician: FABIOLA HWANG  Performed By: Sue Jean     BSA: 2.3 m2  Height: 73 in  Weight: 226 lb  HR: 81  BP: 169/101 mmHg  ______________________________________________________________________________  Procedure  Echocardiogram with two-dimensional, color and spectral Doppler.  ______________________________________________________________________________  Interpretation Summary     Ascending aorta aneurysm is present, 4.5 cm  The visual ejection fraction is 55-60%.  There is mild to moderate concentric left ventricular hypertrophy.  Grade I or early diastolic dysfunction.  No significant valvular lesions  ______________________________________________________________________________  Left Ventricle  The left ventricle is normal in size. Proximal septal thickening is noted.  There is mild to moderate concentric left ventricular hypertrophy. The visual  ejection fraction is 55-60%. Grade I or early diastolic dysfunction.     Right Ventricle  The right ventricle is normal in structure, function and size.     Atria  Normal left atrial size. Right atrial size is normal.     Mitral Valve  The mitral valve leaflets appear thickened, but open well.     Tricuspid Valve  Normal tricuspid valve. There is mild (1+) tricuspid regurgitation.     Aortic Valve  There is mild trileaflet aortic sclerosis. There is trace to mild aortic  regurgitation.     Pulmonic Valve  Normal pulmonic valve.     Vessels  The aortic root is normal size. Ascending aorta aneurysm is present. Inferior  vena cava not well visualized for estimation of right atrial pressure.     Pericardium  There is no pericardial effusion.     Rhythm  Sinus rhythm was noted.  ______________________________________________________________________________  MMode/2D Measurements & Calculations     IVSd: 1.7 cm  LVIDd: 5.0 cm  LVIDs: 3.3 cm  LVPWd: 1.3 cm  FS: 34.0 %  LV mass(C)d:  325.8 grams  LV mass(C)dI: 143.7 grams/m2  Ao root diam: 3.9 cm  LVOT diam: 2.4 cm  LVOT area: 4.4 cm2  Ao root diam index Ht(cm/m): 2.1  Ao root diam index BSA (cm/m2): 1.7  LA Volume (BP): 63.8 ml  LA Volume Index (BP): 28.1 ml/m2     RWT: 0.51  TAPSE: 2.3 cm     Doppler Measurements & Calculations  MV E max leo: 36.7 cm/sec  MV A max leo: 89.6 cm/sec  MV E/A: 0.41  MV max P.4 mmHg  MV mean P.6 mmHg  MV V2 VTI: 20.9 cm  MVA(VTI): 5.2 cm2  MV dec slope: 167.4 cm/sec2  MV dec time: 0.22 sec  LV V1 max P.7 mmHg  LV V1 max: 119.5 cm/sec  LV V1 VTI: 24.5 cm  SV(LVOT): 108.0 ml  SI(LVOT): 47.7 ml/m2  TR max leo: 258.8 cm/sec  TR max P.9 mmHg  E/E' av.3  Lateral E/e': 6.2  Medial E/e': 4.4  RV S Leo: 12.7 cm/sec     ______________________________________________________________________________  Report approved by: Luciano Wallace MD on 07/10/2025 03:18 PM

## 2025-07-12 NOTE — PLAN OF CARE
"Pt is alert and oriented x4 on room air. VSS. SBA with walker. PIV CDI, no signs of inflammation or infiltration. Pt is on tele. 1 dose of oxycodone given for pain (see MAR). Pt concerned about medications given during hospital stay; informed pt to consult with AM provider. PT/OT and social work onboard. Discharge plan is still in progress. Plan of care is continuing.     Goal Outcome Evaluation:      Plan of Care Reviewed With: patient    Overall Patient Progress: improvingOverall Patient Progress: improving    Outcome Evaluation: plan of care is continuing. improving on mobility and pain management.        Problem: Adult Inpatient Plan of Care  Goal: Plan of Care Review  Description: The Plan of Care Review/Shift note should be completed every shift.  The Outcome Evaluation is a brief statement about your assessment that the patient is improving, declining, or no change.  This information will be displayed automatically on your shift  note.  Outcome: Progressing  Flowsheets (Taken 7/12/2025 0566)  Outcome Evaluation: plan of care is continuing. improving on mobility and pain management.  Plan of Care Reviewed With: patient  Overall Patient Progress: improving  Goal: Patient-Specific Goal (Individualized)  Description: You can add care plan individualizations to a care plan. Examples of Individualization might be:  \"Parent requests to be called daily at 9am for status\", \"I have a hard time hearing out of my right ear\", or \"Do not touch me to wake me up as it startles  me\".  Outcome: Progressing  Goal: Absence of Hospital-Acquired Illness or Injury  Outcome: Progressing  Intervention: Identify and Manage Fall Risk  Recent Flowsheet Documentation  Taken 7/11/2025 2154 by Ariane Baxter, RN  Safety Promotion/Fall Prevention:   safety round/check completed   nonskid shoes/slippers when out of bed   room near nurse's station  Intervention: Prevent Skin Injury  Recent Flowsheet Documentation  Taken 7/11/2025 2154 by Sahil, " LUIZA Thakkar  Body Position: position changed independently  Goal: Optimal Comfort and Wellbeing  Outcome: Progressing  Intervention: Monitor Pain and Promote Comfort  Recent Flowsheet Documentation  Taken 7/11/2025 2154 by Ariane Baxter RN  Pain Management Interventions: rest  Goal: Readiness for Transition of Care  Outcome: Progressing     Problem: Delirium  Goal: Optimal Coping  Outcome: Progressing  Goal: Improved Behavioral Control  Outcome: Progressing  Goal: Improved Attention and Thought Clarity  Outcome: Progressing  Goal: Improved Sleep  Outcome: Progressing     Problem: Skin Injury Risk Increased  Goal: Skin Health and Integrity  Outcome: Progressing  Intervention: Plan: Nurse Driven Intervention: Moisture Management  Recent Flowsheet Documentation  Taken 7/11/2025 2154 by Ariane Baxter RN  Moisture Interventions: Encourage regular toileting  Intervention: Plan: Nurse Driven Intervention: Friction and Shear  Recent Flowsheet Documentation  Taken 7/11/2025 2154 by Ariane Baxter RN  Friction/Shear Interventions: HOB 30 degrees or less  Intervention: Optimize Skin Protection  Recent Flowsheet Documentation  Taken 7/11/2025 2154 by Ariane Baxter RN  Head of Bed (HOB) Positioning: HOB at 20-30 degrees     Problem: Orthopaedic Fracture  Goal: Absence of Bleeding  Outcome: Progressing  Goal: Bowel Elimination  Outcome: Progressing  Goal: Absence of Embolism Signs and Symptoms  Outcome: Progressing  Goal: Fracture Stability  Outcome: Progressing  Goal: Optimal Functional Ability  Outcome: Progressing  Goal: Absence of Infection Signs and Symptoms  Outcome: Progressing  Goal: Effective Tissue Perfusion  Outcome: Progressing  Goal: Optimal Pain Control and Function  Outcome: Progressing  Intervention: Manage Acute Orthopaedic-Related Pain  Recent Flowsheet Documentation  Taken 7/11/2025 2154 by Ariane Baxter RN  Pain Management Interventions: rest  Goal: Effective Oxygenation and Ventilation  Outcome: Progressing  Intervention:  Optimize Oxygenation and Ventilation  Recent Flowsheet Documentation  Taken 7/11/2025 2154 by Ariane Baxter, RN  Head of Bed (HOB) Positioning: HOB at 20-30 degrees

## 2025-07-12 NOTE — PLAN OF CARE
"A&O x 4. Pt stable and ready for discharge, Vital signs stable. Discharge instructions, signs and symptoms to report, new medications, diet, activity, and follow up appointments reviewed with patient. New Rx sent to home pharmacy and education on each new medication.   All questions answered and verbalized understanding.    IV out and belongings sent home with pt                        Goal Outcome Evaluation:      Plan of Care Reviewed With: patient    Overall Patient Progress: improvingOverall Patient Progress: improving    Outcome Evaluation: discharging          Problem: Adult Inpatient Plan of Care  Goal: Plan of Care Review  Description: The Plan of Care Review/Shift note should be completed every shift.  The Outcome Evaluation is a brief statement about your assessment that the patient is improving, declining, or no change.  This information will be displayed automatically on your shift  note.  Outcome: Met  Flowsheets (Taken 7/12/2025 1420)  Outcome Evaluation: discharging  Plan of Care Reviewed With: patient  Overall Patient Progress: improving  Goal: Patient-Specific Goal (Individualized)  Description: You can add care plan individualizations to a care plan. Examples of Individualization might be:  \"Parent requests to be called daily at 9am for status\", \"I have a hard time hearing out of my right ear\", or \"Do not touch me to wake me up as it startles  me\".  Outcome: Met  Goal: Absence of Hospital-Acquired Illness or Injury  Outcome: Met  Intervention: Identify and Manage Fall Risk  Recent Flowsheet Documentation  Taken 7/12/2025 0924 by Leah Blankenship, RN  Safety Promotion/Fall Prevention:   supervised activity   safety round/check completed   nonskid shoes/slippers when out of bed   lighting adjusted   increase visualization of patient   increased rounding and observation   room near nurse's station  Intervention: Prevent Skin Injury  Recent Flowsheet Documentation  Taken 7/12/2025 0924 by Krzysztof, " LUIZA Lynn  Body Position: position changed independently  Goal: Optimal Comfort and Wellbeing  Outcome: Met  Intervention: Monitor Pain and Promote Comfort  Recent Flowsheet Documentation  Taken 7/12/2025 0859 by Leah Blankenship RN  Pain Management Interventions:   medication (see MAR)   repositioned  Goal: Readiness for Transition of Care  Outcome: Met     Problem: Delirium  Goal: Optimal Coping  Outcome: Met  Goal: Improved Behavioral Control  Outcome: Met  Intervention: Minimize Safety Risk  Recent Flowsheet Documentation  Taken 7/12/2025 0924 by Leah Blankenship RN  Enhanced Safety Measures: room near unit station  Goal: Improved Attention and Thought Clarity  Outcome: Met  Goal: Improved Sleep  Outcome: Met     Problem: Skin Injury Risk Increased  Goal: Skin Health and Integrity  Outcome: Met  Intervention: Plan: Nurse Driven Intervention: Moisture Management  Recent Flowsheet Documentation  Taken 7/12/2025 0924 by Leah Blankenship RN  Moisture Interventions: Encourage regular toileting  Intervention: Plan: Nurse Driven Intervention: Friction and Shear  Recent Flowsheet Documentation  Taken 7/12/2025 0924 by Leah Blankenship RN  Friction/Shear Interventions: HOB 30 degrees or less  Intervention: Optimize Skin Protection  Recent Flowsheet Documentation  Taken 7/12/2025 0924 by Leah Blankenship RN  Activity Management:   activity adjusted per tolerance   activity encouraged     Problem: Orthopaedic Fracture  Goal: Absence of Bleeding  Outcome: Met  Goal: Bowel Elimination  Outcome: Met  Goal: Absence of Embolism Signs and Symptoms  Outcome: Met  Goal: Fracture Stability  Outcome: Met  Goal: Optimal Functional Ability  Outcome: Met  Intervention: Optimize Functional Ability  Recent Flowsheet Documentation  Taken 7/12/2025 0924 by Leah Blankenship RN  Activity Management:   activity adjusted per tolerance   activity encouraged  Goal: Absence of Infection Signs and Symptoms  Outcome:  Met  Intervention: Prevent or Manage Infection  Recent Flowsheet Documentation  Taken 7/12/2025 0924 by Leah Blankenship, RN  Isolation Precautions: special precautions maintained  Goal: Effective Tissue Perfusion  Outcome: Met  Goal: Optimal Pain Control and Function  Outcome: Met  Intervention: Manage Acute Orthopaedic-Related Pain  Recent Flowsheet Documentation  Taken 7/12/2025 0859 by Leah Blankenship, RN  Pain Management Interventions:   medication (see MAR)   repositioned  Goal: Effective Oxygenation and Ventilation  Outcome: Met  Intervention: Promote Airway Secretion Clearance  Recent Flowsheet Documentation  Taken 7/12/2025 0924 by Leah Blankenship, RN  Activity Management:   activity adjusted per tolerance   activity encouraged

## 2025-07-12 NOTE — CONSULTS
Care Management Discharge Note    Discharge Date: 7/13/2025       Discharge Disposition:  home with spouse    Discharge Services:  none    Discharge DME:      Discharge Transportation:  family    Private pay costs discussed: Not applicable    Does the patient's insurance plan have a 3 day qualifying hospital stay waiver?  Yes     Which insurance plan 3 day waiver is available? Alternative insurance waiver    Will the waiver be used for post-acute placement? No    PAS Confirmation Code: N/A   Patient/family educated on Medicare website which has current facility and service quality ratings:  N/A    Education Provided on the Discharge Plan:  N/A  Persons Notified of Discharge Plans: N/A  Patient/Family in Agreement with the Plan:      Handoff Referral Completed: No, handoff not indicated or clinically appropriate    Additional Information:  Per chart review, patient lives at home with wife. PT noted patient could likely discharge in 1-2 days with home/assist.     Reviewed chart and no social work needs indicated at this time.     Consult cleared. Please re-consult care coordination if needs arise.     MANJEET Renteria, [facility]  [facility]   Please contact the SW on the floor in which the patient is staying for any questions or concerns

## 2025-07-13 NOTE — PLAN OF CARE
Physical Therapy Discharge Summary    Reason for therapy discharge:    Discharged to home with assist.    Progress towards therapy goal(s). See goals on Care Plan in [facility] electronic health record for goal details.  Goals partially met.  Barriers to achieving goals:   discharge from facility.    Therapy recommendation(s):    Continued therapy is recommended.  Rationale/Recommendations:  Patient near baseline functional mobility. Presents with increased pain levels, spinal precautions, weakness, and reduced activity tolerance resulting in the need for supervision and walker to safely mobilize. Lives with spouse in condo, 3 steps to enter with handrails. Recommend discharge home when medically cleared with assist as needed for ADL/IADL's. REcommend use of cane/walker for mobility upon discharge..

## 2025-07-14 ENCOUNTER — PATIENT OUTREACH (OUTPATIENT)
Dept: CARE COORDINATION | Facility: CLINIC | Age: 80
End: 2025-07-14
Payer: COMMERCIAL

## 2025-07-14 NOTE — PROGRESS NOTES
Ambulatory Care Management Chart Review    Situation: Patient chart reviewed by care coordination leader due to critical staffing.    Background: Patient was recently discharged within an [facility] hospital and identified by [facility] ([facility] team) for potential post-discharge outreach.     Assessment: Per chart review, patient has an established PCP outside of [facility] and was in good understanding of discharge instructions and follow up recommendations prior to discharge home.      Plan/Recommendations: Patient can be encouraged to follow discharge instructions as outlined on AVS. No intervention planned by [facility] at this time.    Iris  Supervisor of Ambulatory Care Management  [facility]